# Patient Record
Sex: FEMALE | Race: WHITE | Employment: OTHER | URBAN - METROPOLITAN AREA
[De-identification: names, ages, dates, MRNs, and addresses within clinical notes are randomized per-mention and may not be internally consistent; named-entity substitution may affect disease eponyms.]

---

## 2017-07-27 ENCOUNTER — OFFICE VISIT (OUTPATIENT)
Dept: FAMILY MEDICINE CLINIC | Facility: CLINIC | Age: 67
End: 2017-07-27

## 2017-07-27 ENCOUNTER — HOSPITAL ENCOUNTER (OUTPATIENT)
Dept: GENERAL RADIOLOGY | Age: 67
Discharge: HOME | End: 2017-07-27
Attending: FAMILY MEDICINE | Admitting: FAMILY MEDICINE
Payer: MEDICARE

## 2017-07-27 VITALS — DIASTOLIC BLOOD PRESSURE: 72 MMHG | SYSTOLIC BLOOD PRESSURE: 128 MMHG | HEIGHT: 61 IN | TEMPERATURE: 97 F

## 2017-07-27 DIAGNOSIS — M25.472 LEFT ANKLE SWELLING: Primary | ICD-10-CM

## 2017-07-27 DIAGNOSIS — I83.812 VARICOSE VEINS OF LEFT LOWER EXTREMITIES WITH PAIN: ICD-10-CM

## 2017-07-27 DIAGNOSIS — M25.572 ACUTE LEFT ANKLE PAIN: ICD-10-CM

## 2017-07-27 DIAGNOSIS — M25.472 LEFT ANKLE SWELLING: ICD-10-CM

## 2017-07-27 PROCEDURE — G0463 HOSPITAL OUTPT CLINIC VISIT: HCPCS | Performed by: FAMILY MEDICINE

## 2017-07-27 PROCEDURE — 73610 X-RAY EXAM OF ANKLE: CPT | Performed by: FAMILY MEDICINE

## 2017-07-27 PROCEDURE — 99214 OFFICE O/P EST MOD 30 MIN: CPT | Performed by: FAMILY MEDICINE

## 2017-07-27 RX ORDER — MELOXICAM 7.5 MG/1
7.5 TABLET ORAL DAILY
Qty: 30 TABLET | Refills: 0 | Status: SHIPPED | OUTPATIENT
Start: 2017-07-27 | End: 2017-10-24

## 2017-07-27 NOTE — PROGRESS NOTES
Patient ID: Ke Hernandez is a 74year old female. HPI  Patient presents with: Foot Injury: L. x4 weeks     She states she was moving and she had to go up a flight of stairs up and down numerous times.   She was in an old house with many flights of sta have some varicose veins, especially in the medial aspect of the ankle but otherwise really no edema but does have some puffiness on the medial aspect of the ankle where the varicose veins are. There is no ulcerations of the skin or break in the skin.   Ne

## 2017-08-01 ENCOUNTER — TELEPHONE (OUTPATIENT)
Dept: FAMILY MEDICINE CLINIC | Facility: CLINIC | Age: 67
End: 2017-08-01

## 2017-08-01 NOTE — TELEPHONE ENCOUNTER
Pt would like to get results of XR of L ankle. Pt. States that pain medication is not helping with ankle pain.

## 2017-08-07 NOTE — TELEPHONE ENCOUNTER
Notes Recorded by Kenya Jung on 8/1/2017 at 1:31 PM CDT  Lab letter mailed to patient  ------    Notes Recorded by Brandee Cox DO on 7/27/2017 at 5:06 PM CDT  Left ankle shows no fracture.  You do have some soft tissue swelling and edema but this

## 2017-10-24 ENCOUNTER — LAB ENCOUNTER (OUTPATIENT)
Dept: LAB | Age: 67
End: 2017-10-24
Attending: FAMILY MEDICINE
Payer: MEDICARE

## 2017-10-24 ENCOUNTER — OFFICE VISIT (OUTPATIENT)
Dept: FAMILY MEDICINE CLINIC | Facility: CLINIC | Age: 67
End: 2017-10-24

## 2017-10-24 ENCOUNTER — HOSPITAL ENCOUNTER (OUTPATIENT)
Dept: GENERAL RADIOLOGY | Age: 67
Discharge: HOME | End: 2017-10-24
Attending: FAMILY MEDICINE
Payer: MEDICARE

## 2017-10-24 ENCOUNTER — HOSPITAL ENCOUNTER (OUTPATIENT)
Dept: GENERAL RADIOLOGY | Age: 67
Discharge: HOME | End: 2017-10-24
Attending: FAMILY MEDICINE | Admitting: FAMILY MEDICINE
Payer: MEDICARE

## 2017-10-24 VITALS
HEART RATE: 94 BPM | HEIGHT: 61 IN | TEMPERATURE: 98 F | BODY MASS INDEX: 21.14 KG/M2 | WEIGHT: 112 LBS | DIASTOLIC BLOOD PRESSURE: 75 MMHG | SYSTOLIC BLOOD PRESSURE: 118 MMHG

## 2017-10-24 DIAGNOSIS — R60.0 BILATERAL LEG EDEMA: Primary | ICD-10-CM

## 2017-10-24 DIAGNOSIS — I83.813 VARICOSE VEINS OF BILATERAL LOWER EXTREMITIES WITH PAIN: ICD-10-CM

## 2017-10-24 DIAGNOSIS — M79.89 LEG SWELLING: ICD-10-CM

## 2017-10-24 DIAGNOSIS — L84 CALLUS OF FOOT: ICD-10-CM

## 2017-10-24 DIAGNOSIS — M21.41 PES PLANUS OF BOTH FEET: ICD-10-CM

## 2017-10-24 DIAGNOSIS — R60.0 BILATERAL LEG EDEMA: ICD-10-CM

## 2017-10-24 DIAGNOSIS — S90.32XA BRUISED SOLE OF FOOT, LEFT, INITIAL ENCOUNTER: ICD-10-CM

## 2017-10-24 DIAGNOSIS — S90.32XA CONTUSION OF LEFT FOOT, INITIAL ENCOUNTER: ICD-10-CM

## 2017-10-24 DIAGNOSIS — M79.672 LEFT FOOT PAIN: ICD-10-CM

## 2017-10-24 DIAGNOSIS — Z00.00 ROUTINE GENERAL MEDICAL EXAMINATION AT A HEALTH CARE FACILITY: Primary | ICD-10-CM

## 2017-10-24 DIAGNOSIS — M21.42 PES PLANUS OF BOTH FEET: ICD-10-CM

## 2017-10-24 PROCEDURE — 93010 ELECTROCARDIOGRAM REPORT: CPT | Performed by: FAMILY MEDICINE

## 2017-10-24 PROCEDURE — 80061 LIPID PANEL: CPT | Performed by: FAMILY MEDICINE

## 2017-10-24 PROCEDURE — 71020 XR CHEST PA + LAT CHEST (CPT=71020): CPT | Performed by: FAMILY MEDICINE

## 2017-10-24 PROCEDURE — G0463 HOSPITAL OUTPT CLINIC VISIT: HCPCS | Performed by: FAMILY MEDICINE

## 2017-10-24 PROCEDURE — 80053 COMPREHEN METABOLIC PANEL: CPT

## 2017-10-24 PROCEDURE — 85025 COMPLETE CBC W/AUTO DIFF WBC: CPT

## 2017-10-24 PROCEDURE — 99215 OFFICE O/P EST HI 40 MIN: CPT | Performed by: FAMILY MEDICINE

## 2017-10-24 PROCEDURE — 84443 ASSAY THYROID STIM HORMONE: CPT

## 2017-10-24 PROCEDURE — 84481 FREE ASSAY (FT-3): CPT | Performed by: FAMILY MEDICINE

## 2017-10-24 PROCEDURE — 93005 ELECTROCARDIOGRAM TRACING: CPT

## 2017-10-24 PROCEDURE — 36415 COLL VENOUS BLD VENIPUNCTURE: CPT

## 2017-10-24 PROCEDURE — 73630 X-RAY EXAM OF FOOT: CPT | Performed by: FAMILY MEDICINE

## 2017-10-24 PROCEDURE — 84439 ASSAY OF FREE THYROXINE: CPT | Performed by: FAMILY MEDICINE

## 2017-10-24 RX ORDER — SPIRONOLACTONE 25 MG/1
25 TABLET ORAL 2 TIMES DAILY
Qty: 60 TABLET | Refills: 0 | Status: SHIPPED | OUTPATIENT
Start: 2017-10-24 | End: 2017-11-07

## 2017-10-24 NOTE — PROGRESS NOTES
Patient ID: Amarilys Mayers is a 69year old female. HPI  Patient presents with:  Edema: L. leg. ankle  bilateral   I had seen her in July 2017 due to left leg edema due to varicose veins. We had an x-ray done which showed no fractures.     She states th history. No current outpatient prescriptions on file.   Allergies:  Penicillins             Rash   PHYSICAL EXAM:   Physical Exam  Blood pressure 118/75, pulse 94, temperature 97.7 °F (36.5 °C), temperature source Oral, height 5' 1\" (1.549 m), weight that would indicate a DVT as she has been active and really does not have any calf tenderness, redness, shortness of breath or chest pain. Varicose veins of bilateral lower extremities with pain  -     US VENOUS INSUFFICIENCY BILATERAL  (ZZB=[id number]);  Futur

## 2017-10-25 ENCOUNTER — TELEPHONE (OUTPATIENT)
Dept: OTHER | Age: 67
End: 2017-10-25

## 2017-10-25 NOTE — TELEPHONE ENCOUNTER
LMTCB. Transfer to [id number].    ----- Message from Randall Pereira RN sent at 10/25/2017  2:19 PM CDT -----      ----- Message -----  From: Bell Emanuel DO  Sent: 10/25/2017   2:06 PM  To:  Princess Mo Lpn/Ishan    EKGs shows sinus rhythm with some extra beats

## 2017-10-25 NOTE — TELEPHONE ENCOUNTER
Please advise ,Pt had appt yesterday,started RX spironolactone 25 MG Oral Tab-have taken 3  3 doses-c/c hardly urinating,do not feel bloated or have the urge to urinate   Stated she was urinatinating less than normal but can wiggle her toes more than yeste

## 2017-10-25 NOTE — TELEPHONE ENCOUNTER
LMTCB with patient's son. Transfer to [phone].      ----- Message from Mindy Lewis DO sent at 10/25/2017  2:57 PM CDT -----  Let her know her TSH is much too high. This means she has an underactive thyroid. I need to place her on thyroid medication.   Eddie Hermosillo

## 2017-10-26 NOTE — TELEPHONE ENCOUNTER
LMTCB with her . Please review all messages. I do not know is she is aware of the TSH results either. The one below is knew.         Notes Recorded by Luisa Briones DO on 10/25/2017 at 8:34 PM CDT  Your bad cholesterol is 115 and because of

## 2017-10-26 NOTE — TELEPHONE ENCOUNTER
I would continue with the spironolactone twice daily.   If she truly has urinary retention where she cannot urinate at all and her lower abdomen is getting very painful or bloated then of course she needs to let us know or go immediately to the emergency ro

## 2017-10-27 ENCOUNTER — OFFICE VISIT (OUTPATIENT)
Dept: PODIATRY CLINIC | Facility: CLINIC | Age: 67
End: 2017-10-27

## 2017-10-27 DIAGNOSIS — M79.672 PAIN OF LEFT HEEL: Primary | ICD-10-CM

## 2017-10-27 DIAGNOSIS — Q82.8 POROKERATOSIS: ICD-10-CM

## 2017-10-27 PROCEDURE — 99202 OFFICE O/P NEW SF 15 MIN: CPT | Performed by: PODIATRIST

## 2017-10-27 PROCEDURE — G0463 HOSPITAL OUTPT CLINIC VISIT: HCPCS | Performed by: PODIATRIST

## 2017-10-27 NOTE — PROGRESS NOTES
HPI:    Patient ID: Debbie Ramirez is a 69year old female. HPI  This pleasant 20-year-old female presents as a new patient to me on referral from  Ashley Garrison.   Patient is chief complaint is left heel pain in an area where she is aware of the callus that h encounter    Imaging & Referrals:  None       KRISTINE#[id number]

## 2017-10-31 ENCOUNTER — OFFICE VISIT (OUTPATIENT)
Dept: FAMILY MEDICINE CLINIC | Facility: CLINIC | Age: 67
End: 2017-10-31

## 2017-10-31 VITALS
HEIGHT: 61 IN | TEMPERATURE: 98 F | HEART RATE: 84 BPM | RESPIRATION RATE: 12 BRPM | DIASTOLIC BLOOD PRESSURE: 73 MMHG | SYSTOLIC BLOOD PRESSURE: 122 MMHG

## 2017-10-31 DIAGNOSIS — R60.0 BILATERAL LEG EDEMA: Primary | ICD-10-CM

## 2017-10-31 DIAGNOSIS — E78.2 MIXED HYPERLIPIDEMIA: ICD-10-CM

## 2017-10-31 DIAGNOSIS — I70.0 CALCIFICATION OF AORTA (HCC): ICD-10-CM

## 2017-10-31 DIAGNOSIS — E03.9 ACQUIRED HYPOTHYROIDISM: ICD-10-CM

## 2017-10-31 PROCEDURE — G0463 HOSPITAL OUTPT CLINIC VISIT: HCPCS | Performed by: FAMILY MEDICINE

## 2017-10-31 PROCEDURE — 99214 OFFICE O/P EST MOD 30 MIN: CPT | Performed by: FAMILY MEDICINE

## 2017-10-31 NOTE — PROGRESS NOTES
Patient ID: Domenic Astorga is a 69year old female. HPI  Patient presents with:   Follow - Up: lipids    She states after I placed her on the spironolactone and the thyroid medication she feels like she is walking a bit quicker and she has less pain in h 10/24/2017   CREATSERUM 0.43 (L) 10/24/2017   ANIONGAP 10 10/24/2017   GFRNAA >60 10/24/2017   GFRAA >60 10/24/2017   CA 9.3 10/24/2017   OSMOCALC 271 (L) 10/24/2017   ALKPHO 82 10/24/2017   AST 24 10/24/2017   ALT 15 10/24/2017   BILT 0.5 10/24/2017   TP 126 lb (57.2 kg)      There is no height or weight on file to calculate BMI.     BP Readings from Last 6 Encounters:  10/31/17 : 122/73  10/24/17 : 118/75  07/27/17 : 128/72  02/02/16 : 103/62  07/13/15 : 112/73  03/16/15 : 132/77        Review of Systems Diagnoses and all orders for this visit:    Bilateral leg edema  -     BASIC METABOLIC PANEL (8); Future  In 1 week I want you to do a BMP. She looks in no distress.   Acquired hypothyroidism  Go ahead and stay on the Levoxyl  Mixed hyperlipidemia  Go

## 2017-11-02 ENCOUNTER — NURSE TRIAGE (OUTPATIENT)
Dept: OTHER | Age: 67
End: 2017-11-02

## 2017-11-02 NOTE — TELEPHONE ENCOUNTER
Dr Sweta Maradiaga, pt called back about message below and did schedule appt for tomorrow so you can assess her. If you are able to offer any advice before then, please let us know; otherwise pt will see you at 9:30am tomorrow.

## 2017-11-03 ENCOUNTER — HOSPITAL ENCOUNTER (OUTPATIENT)
Dept: GENERAL RADIOLOGY | Age: 67
Discharge: HOME | End: 2017-11-03
Attending: FAMILY MEDICINE | Admitting: FAMILY MEDICINE
Payer: MEDICARE

## 2017-11-03 ENCOUNTER — OFFICE VISIT (OUTPATIENT)
Dept: FAMILY MEDICINE CLINIC | Facility: CLINIC | Age: 67
End: 2017-11-03

## 2017-11-03 VITALS
HEIGHT: 61 IN | HEART RATE: 84 BPM | TEMPERATURE: 97 F | BODY MASS INDEX: 20.2 KG/M2 | SYSTOLIC BLOOD PRESSURE: 118 MMHG | DIASTOLIC BLOOD PRESSURE: 70 MMHG | WEIGHT: 107 LBS

## 2017-11-03 DIAGNOSIS — R60.0 BILATERAL LEG EDEMA: Primary | ICD-10-CM

## 2017-11-03 DIAGNOSIS — M79.672 PAIN IN LEFT FOOT: ICD-10-CM

## 2017-11-03 DIAGNOSIS — K14.8 DRY TONGUE: ICD-10-CM

## 2017-11-03 DIAGNOSIS — S90.32XA CONTUSION OF LEFT FOOT, INITIAL ENCOUNTER: ICD-10-CM

## 2017-11-03 DIAGNOSIS — K14.0 GLOSSITIS: ICD-10-CM

## 2017-11-03 PROCEDURE — 73620 X-RAY EXAM OF FOOT: CPT | Performed by: FAMILY MEDICINE

## 2017-11-03 PROCEDURE — G0463 HOSPITAL OUTPT CLINIC VISIT: HCPCS | Performed by: FAMILY MEDICINE

## 2017-11-03 PROCEDURE — 99214 OFFICE O/P EST MOD 30 MIN: CPT | Performed by: FAMILY MEDICINE
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
## 2017-11-06 ENCOUNTER — APPOINTMENT (OUTPATIENT)
Dept: LAB | Age: 67
End: 2017-11-06
Attending: FAMILY MEDICINE
Payer: MEDICARE

## 2017-11-06 DIAGNOSIS — R60.0 BILATERAL LEG EDEMA: ICD-10-CM

## 2017-11-06 PROCEDURE — 36415 COLL VENOUS BLD VENIPUNCTURE: CPT

## 2017-11-06 PROCEDURE — 80048 BASIC METABOLIC PNL TOTAL CA: CPT

## 2017-11-07 ENCOUNTER — OFFICE VISIT (OUTPATIENT)
Dept: FAMILY MEDICINE CLINIC | Facility: CLINIC | Age: 67
End: 2017-11-07

## 2017-11-07 VITALS
BODY MASS INDEX: 20.58 KG/M2 | HEART RATE: 88 BPM | WEIGHT: 109 LBS | RESPIRATION RATE: 14 BRPM | HEIGHT: 61 IN | SYSTOLIC BLOOD PRESSURE: 126 MMHG | DIASTOLIC BLOOD PRESSURE: 74 MMHG | TEMPERATURE: 99 F

## 2017-11-07 DIAGNOSIS — E87.1 HYPONATREMIA: Primary | ICD-10-CM

## 2017-11-07 PROCEDURE — 99213 OFFICE O/P EST LOW 20 MIN: CPT | Performed by: FAMILY MEDICINE

## 2017-11-07 PROCEDURE — G0463 HOSPITAL OUTPT CLINIC VISIT: HCPCS | Performed by: FAMILY MEDICINE

## 2017-11-07 NOTE — PROGRESS NOTES
Milly Lindsay is a 69year old female. Patient presents with: Follow - Up: low sodium     HPI:   Here for follow up on low sodium. Drinks about 4 cups of coffee in the mornings and drinks a lot of water thereafter. Reports eats fairly well.  Usually has distress  NECK: supple,no adenopathy,no bruits  LUNGS: clear to auscultation  CARDIO: RRR without murmur  EXTREMITIES: no cyanosis, clubbing or edema  wide lower legs but no pitting edema     ASSESSMENT AND PLAN:   1.  Hyponatremia  Repeat BMP this Saturday

## 2017-11-10 NOTE — PROGRESS NOTES
Other labs were normal so believe low sodium from the water pill and excess coffee drinking.  Discussed with pt cutting back on fluid intake

## 2017-11-14 ENCOUNTER — TELEPHONE (OUTPATIENT)
Dept: FAMILY MEDICINE CLINIC | Facility: CLINIC | Age: 67
End: 2017-11-14

## 2017-11-14 NOTE — TELEPHONE ENCOUNTER
Pt called in upset that she had to reschedule her appt from 11/21. I offered the next available but Pt insisted that Dr. Sonal Guthrie needed to see her sooner.     Please reply to pool: ADRI Kemp

## 2017-11-27 ENCOUNTER — OFFICE VISIT (OUTPATIENT)
Dept: FAMILY MEDICINE CLINIC | Facility: CLINIC | Age: 67
End: 2017-11-27

## 2017-11-27 ENCOUNTER — APPOINTMENT (OUTPATIENT)
Dept: LAB | Age: 67
End: 2017-11-27
Attending: FAMILY MEDICINE
Payer: MEDICARE

## 2017-11-27 VITALS
BODY MASS INDEX: 21.14 KG/M2 | WEIGHT: 112 LBS | TEMPERATURE: 97 F | HEIGHT: 61 IN | HEART RATE: 90 BPM | SYSTOLIC BLOOD PRESSURE: 116 MMHG | DIASTOLIC BLOOD PRESSURE: 74 MMHG

## 2017-11-27 DIAGNOSIS — M25.511 PAIN IN JOINT OF RIGHT SHOULDER: ICD-10-CM

## 2017-11-27 DIAGNOSIS — Z96.611 STATUS POST REVERSE ARTHROPLASTY OF RIGHT SHOULDER: ICD-10-CM

## 2017-11-27 DIAGNOSIS — E03.9 ACQUIRED HYPOTHYROIDISM: Primary | ICD-10-CM

## 2017-11-27 DIAGNOSIS — E87.1 HYPONATREMIA: ICD-10-CM

## 2017-11-27 DIAGNOSIS — E78.2 MIXED HYPERLIPIDEMIA: ICD-10-CM

## 2017-11-27 DIAGNOSIS — E03.9 ACQUIRED HYPOTHYROIDISM: ICD-10-CM

## 2017-11-27 PROCEDURE — 80048 BASIC METABOLIC PNL TOTAL CA: CPT

## 2017-11-27 PROCEDURE — 36415 COLL VENOUS BLD VENIPUNCTURE: CPT

## 2017-11-27 PROCEDURE — 99214 OFFICE O/P EST MOD 30 MIN: CPT | Performed by: FAMILY MEDICINE

## 2017-11-27 PROCEDURE — 84443 ASSAY THYROID STIM HORMONE: CPT

## 2017-11-27 PROCEDURE — G0463 HOSPITAL OUTPT CLINIC VISIT: HCPCS | Performed by: FAMILY MEDICINE

## 2017-11-27 RX ORDER — LEVOTHYROXINE SODIUM 0.1 MG/1
100 TABLET ORAL
Qty: 30 TABLET | Refills: 2 | Status: ON HOLD | OUTPATIENT
Start: 2017-11-27 | End: 2021-01-01

## 2017-11-27 NOTE — PROGRESS NOTES
Patient ID: Reg Ceballos is a 69year old female. HPI  Patient presents with:  Lab  On November 6, 2017 her sodium was 127. We felt this was most likely due to drinking too much fluid. We had her stop the Aldactone 25 mg.   She did follow with Dr. Julius Padilla 11/06/2017   ANIONGAP 6 11/06/2017   GFRAA >60 11/06/2017   GFRNAA >60 11/06/2017   CA 9.3 11/06/2017    (L) 11/06/2017   K 4.7 11/06/2017   CL 94 (L) 11/06/2017   CO2 27 11/06/2017   OSMOCALC 262 (L) 11/06/2017         Lab Results  Component Value D Negative for choking, shortness of breath and wheezing. Cardiovascular: Negative for chest pain, palpitations and leg swelling. Neurological: Negative for dizziness, speech difficulty, light-headedness and headaches.          Past Medical History:   Adriana Kendrick motion of the right shoulder after the arthroplasty. Nursing note and vitals reviewed.          ASSESSMENT/PLAN:     Diagnoses and all orders for this visit:    Acquired hypothyroidism  -     ASSAY, THYROID STIM HORMONE; Future  She will go down and do a T

## 2018-01-01 NOTE — TELEPHONE ENCOUNTER
Patient returning call, informed pt of the multiple notes from Dr Skye Gilliam, patient verbalized understanding. Advised patient that her new medications were sent to pharmacy.  Pt did not have appointment for next week, booked patient with Dr Skye Gilliam for Tuesday 0

## 2021-01-01 ENCOUNTER — APPOINTMENT (OUTPATIENT)
Dept: GENERAL RADIOLOGY | Facility: HOSPITAL | Age: 71
End: 2021-01-01
Attending: EMERGENCY MEDICINE
Payer: MEDICARE

## 2021-01-01 ENCOUNTER — HOSPITAL ENCOUNTER (EMERGENCY)
Facility: HOSPITAL | Age: 71
Discharge: LEFT BEFORE DISCHARGE | End: 2021-01-01
Payer: MEDICARE

## 2021-01-01 ENCOUNTER — APPOINTMENT (OUTPATIENT)
Dept: CT IMAGING | Facility: HOSPITAL | Age: 71
End: 2021-01-01
Attending: EMERGENCY MEDICINE
Payer: MEDICARE

## 2021-01-01 ENCOUNTER — HOSPITAL ENCOUNTER (EMERGENCY)
Facility: HOSPITAL | Age: 71
Discharge: HOME | End: 2021-01-01
Attending: EMERGENCY MEDICINE
Payer: MEDICARE

## 2021-01-01 ENCOUNTER — HOSPITAL ENCOUNTER (INPATIENT)
Facility: HOSPITAL | Age: 71
LOS: 2 days | Discharge: HOSPICE | DRG: 545 | End: 2021-01-01
Attending: HOSPITALIST | Admitting: HOSPITALIST
Payer: OTHER MISCELLANEOUS

## 2021-01-01 ENCOUNTER — HOSPITAL ENCOUNTER (OUTPATIENT)
Facility: HOSPITAL | Age: 71
Setting detail: OBSERVATION
Discharge: HOME | End: 2021-01-01
Attending: EMERGENCY MEDICINE | Admitting: HOSPITALIST
Payer: MEDICARE

## 2021-01-01 ENCOUNTER — HOSPITAL ENCOUNTER (INPATIENT)
Facility: HOSPITAL | Age: 71
LOS: 2 days | Discharge: HOME | DRG: 871 | End: 2021-01-01
Attending: EMERGENCY MEDICINE | Admitting: HOSPITALIST
Payer: MEDICARE

## 2021-01-01 ENCOUNTER — APPOINTMENT (OUTPATIENT)
Dept: GENERAL RADIOLOGY | Facility: HOSPITAL | Age: 71
DRG: 871 | End: 2021-01-01
Attending: EMERGENCY MEDICINE
Payer: MEDICARE

## 2021-01-01 VITALS
RESPIRATION RATE: 16 BRPM | BODY MASS INDEX: 23 KG/M2 | TEMPERATURE: 98 F | SYSTOLIC BLOOD PRESSURE: 103 MMHG | HEART RATE: 90 BPM | DIASTOLIC BLOOD PRESSURE: 63 MMHG | WEIGHT: 121.25 LBS | OXYGEN SATURATION: 92 %

## 2021-01-01 VITALS
WEIGHT: 121.25 LBS | DIASTOLIC BLOOD PRESSURE: 64 MMHG | HEART RATE: 89 BPM | SYSTOLIC BLOOD PRESSURE: 119 MMHG | BODY MASS INDEX: 23 KG/M2 | OXYGEN SATURATION: 96 % | TEMPERATURE: 97 F | RESPIRATION RATE: 19 BRPM

## 2021-01-01 VITALS
OXYGEN SATURATION: 81 % | SYSTOLIC BLOOD PRESSURE: 94 MMHG | DIASTOLIC BLOOD PRESSURE: 63 MMHG | TEMPERATURE: 99 F | HEART RATE: 60 BPM | RESPIRATION RATE: 18 BRPM

## 2021-01-01 VITALS
HEART RATE: 115 BPM | DIASTOLIC BLOOD PRESSURE: 82 MMHG | RESPIRATION RATE: 20 BRPM | SYSTOLIC BLOOD PRESSURE: 107 MMHG | TEMPERATURE: 99 F | OXYGEN SATURATION: 95 %

## 2021-01-01 VITALS
OXYGEN SATURATION: 97 % | TEMPERATURE: 99 F | WEIGHT: 120 LBS | BODY MASS INDEX: 22.66 KG/M2 | HEIGHT: 61 IN | SYSTOLIC BLOOD PRESSURE: 144 MMHG | RESPIRATION RATE: 18 BRPM | DIASTOLIC BLOOD PRESSURE: 70 MMHG | HEART RATE: 84 BPM

## 2021-01-01 VITALS
WEIGHT: 120 LBS | HEART RATE: 83 BPM | DIASTOLIC BLOOD PRESSURE: 68 MMHG | BODY MASS INDEX: 22.66 KG/M2 | RESPIRATION RATE: 17 BRPM | TEMPERATURE: 98 F | HEIGHT: 61 IN | OXYGEN SATURATION: 100 % | SYSTOLIC BLOOD PRESSURE: 131 MMHG

## 2021-01-01 DIAGNOSIS — L89.154 PRESSURE INJURY OF SACRAL REGION, STAGE 4 (HCC): ICD-10-CM

## 2021-01-01 DIAGNOSIS — M25.562 LEFT KNEE PAIN, UNSPECIFIED CHRONICITY: ICD-10-CM

## 2021-01-01 DIAGNOSIS — N39.0 SEPSIS DUE TO URINARY TRACT INFECTION (HCC): Primary | ICD-10-CM

## 2021-01-01 DIAGNOSIS — S00.03XA HEMATOMA OF SCALP, INITIAL ENCOUNTER: Primary | ICD-10-CM

## 2021-01-01 DIAGNOSIS — G89.29 CHRONIC PAIN OF LEFT KNEE: ICD-10-CM

## 2021-01-01 DIAGNOSIS — R53.1 WEAKNESS GENERALIZED: Primary | ICD-10-CM

## 2021-01-01 DIAGNOSIS — M25.562 CHRONIC PAIN OF LEFT KNEE: ICD-10-CM
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
## 2021-01-01 DIAGNOSIS — A41.9 SEPSIS DUE TO URINARY TRACT INFECTION (HCC): Primary | ICD-10-CM

## 2021-01-01 DIAGNOSIS — R62.7 FAILURE TO THRIVE IN ADULT: Primary | ICD-10-CM

## 2021-01-01 PROCEDURE — 85025 COMPLETE CBC W/AUTO DIFF WBC: CPT | Performed by: EMERGENCY MEDICINE

## 2021-01-01 PROCEDURE — 74177 CT ABD & PELVIS W/CONTRAST: CPT | Performed by: EMERGENCY MEDICINE

## 2021-01-01 PROCEDURE — 99284 EMERGENCY DEPT VISIT MOD MDM: CPT

## 2021-01-01 PROCEDURE — 99221 1ST HOSP IP/OBS SF/LOW 40: CPT | Performed by: SURGERY

## 2021-01-01 PROCEDURE — 81003 URINALYSIS AUTO W/O SCOPE: CPT | Performed by: EMERGENCY MEDICINE

## 2021-01-01 PROCEDURE — 99233 SBSQ HOSP IP/OBS HIGH 50: CPT | Performed by: HOSPITALIST

## 2021-01-01 PROCEDURE — 99239 HOSP IP/OBS DSCHRG MGMT >30: CPT | Performed by: HOSPITALIST

## 2021-01-01 PROCEDURE — 80048 BASIC METABOLIC PNL TOTAL CA: CPT | Performed by: EMERGENCY MEDICINE

## 2021-01-01 PROCEDURE — 99233 SBSQ HOSP IP/OBS HIGH 50: CPT | Performed by: INTERNAL MEDICINE

## 2021-01-01 PROCEDURE — 99497 ADVNCD CARE PLAN 30 MIN: CPT | Performed by: HOSPITALIST

## 2021-01-01 PROCEDURE — 99217 OBSERVATION CARE DISCHARGE: CPT | Performed by: HOSPITALIST

## 2021-01-01 PROCEDURE — 99222 1ST HOSP IP/OBS MODERATE 55: CPT | Performed by: HOSPITALIST

## 2021-01-01 PROCEDURE — 71045 X-RAY EXAM CHEST 1 VIEW: CPT | Performed by: EMERGENCY MEDICINE

## 2021-01-01 PROCEDURE — 70450 CT HEAD/BRAIN W/O DYE: CPT | Performed by: EMERGENCY MEDICINE

## 2021-01-01 PROCEDURE — 99223 1ST HOSP IP/OBS HIGH 75: CPT | Performed by: HOSPITALIST

## 2021-01-01 PROCEDURE — 72125 CT NECK SPINE W/O DYE: CPT | Performed by: EMERGENCY MEDICINE

## 2021-01-01 PROCEDURE — 36415 COLL VENOUS BLD VENIPUNCTURE: CPT

## 2021-01-01 PROCEDURE — 73560 X-RAY EXAM OF KNEE 1 OR 2: CPT | Performed by: EMERGENCY MEDICINE

## 2021-01-01 RX ORDER — MIRTAZAPINE 15 MG/1
15 TABLET, FILM COATED ORAL NIGHTLY
Qty: 30 TABLET | Refills: 0 | Status: SHIPPED | OUTPATIENT
Start: 2021-01-01 | End: 2021-01-01

## 2021-01-01 RX ORDER — HEPARIN SODIUM 5000 [USP'U]/ML
5000 INJECTION, SOLUTION INTRAVENOUS; SUBCUTANEOUS EVERY 12 HOURS SCHEDULED
Status: DISCONTINUED | OUTPATIENT
Start: 2021-01-01 | End: 2021-01-01

## 2021-01-01 RX ORDER — CEFADROXIL 500 MG/1
500 CAPSULE ORAL 2 TIMES DAILY
Qty: 14 CAPSULE | Refills: 0 | Status: SHIPPED | OUTPATIENT
Start: 2021-01-01 | End: 2021-01-01

## 2021-01-01 RX ORDER — SODIUM CHLORIDE 0.9 % (FLUSH) 0.9 %
10 SYRINGE (ML) INJECTION AS NEEDED
Status: DISCONTINUED | OUTPATIENT
Start: 2021-01-01 | End: 2021-01-01

## 2021-01-01 RX ORDER — LORAZEPAM 2 MG/ML
1 INJECTION INTRAMUSCULAR EVERY 4 HOURS PRN
Status: DISCONTINUED | OUTPATIENT
Start: 2021-01-01 | End: 2021-01-01

## 2021-01-01 RX ORDER — VANCOMYCIN HYDROCHLORIDE
1250
Status: DISCONTINUED | OUTPATIENT
Start: 2021-01-01 | End: 2021-01-01

## 2021-01-01 RX ORDER — VANCOMYCIN HYDROCHLORIDE
25 ONCE
Status: COMPLETED | OUTPATIENT
Start: 2021-01-01 | End: 2021-01-01

## 2021-01-01 RX ORDER — ACETAMINOPHEN 160 MG/5ML
1000 SOLUTION ORAL ONCE
Status: COMPLETED | OUTPATIENT
Start: 2021-01-01 | End: 2021-01-01

## 2021-01-01 RX ORDER — LEVOTHYROXINE SODIUM 0.05 MG/1
50 TABLET ORAL
Status: DISCONTINUED | OUTPATIENT
Start: 2021-01-01 | End: 2021-01-01

## 2021-01-01 RX ORDER — ONDANSETRON 2 MG/ML
4 INJECTION INTRAMUSCULAR; INTRAVENOUS EVERY 6 HOURS PRN
Status: DISCONTINUED | OUTPATIENT
Start: 2021-01-01 | End: 2021-01-01

## 2021-01-01 RX ORDER — HALOPERIDOL 5 MG/ML
2 INJECTION INTRAMUSCULAR
Status: DISCONTINUED | OUTPATIENT
Start: 2021-01-01 | End: 2021-01-01

## 2021-01-01 RX ORDER — ONDANSETRON 4 MG/1
4 TABLET, ORALLY DISINTEGRATING ORAL EVERY 6 HOURS PRN
Status: DISCONTINUED | OUTPATIENT
Start: 2021-01-01 | End: 2021-01-01

## 2021-01-01 RX ORDER — MIRTAZAPINE 15 MG/1
15 TABLET, FILM COATED ORAL NIGHTLY
Status: DISCONTINUED | OUTPATIENT
Start: 2021-01-01 | End: 2021-01-01

## 2021-01-01 RX ORDER — LEVOTHYROXINE SODIUM 0.1 MG/1
100 TABLET ORAL
Status: DISCONTINUED | OUTPATIENT
Start: 2021-01-01 | End: 2021-01-01

## 2021-01-01 RX ORDER — METOCLOPRAMIDE HYDROCHLORIDE 5 MG/ML
10 INJECTION INTRAMUSCULAR; INTRAVENOUS EVERY 8 HOURS PRN
Status: DISCONTINUED | OUTPATIENT
Start: 2021-01-01 | End: 2021-01-01

## 2021-01-01 RX ORDER — MELATONIN
100 DAILY
Status: DISCONTINUED | OUTPATIENT
Start: 2021-01-01 | End: 2021-01-01

## 2021-01-01 RX ORDER — LORAZEPAM 2 MG/ML
2 INJECTION INTRAMUSCULAR EVERY 4 HOURS PRN
Status: DISCONTINUED | OUTPATIENT
Start: 2021-01-01 | End: 2021-01-01

## 2021-01-01 RX ORDER — LEVOTHYROXINE SODIUM 0.1 MG/1
50 TABLET ORAL
Qty: 30 TABLET | Refills: 2 | Status: SHIPPED | COMMUNITY
Start: 2021-01-01 | End: 2021-01-01

## 2021-01-01 RX ORDER — BISACODYL 10 MG
10 SUPPOSITORY, RECTAL RECTAL
Status: DISCONTINUED | OUTPATIENT
Start: 2021-01-01 | End: 2021-01-01

## 2021-01-01 RX ORDER — DIPHENHYDRAMINE HCL 25 MG
25 CAPSULE ORAL NIGHTLY
Status: DISCONTINUED | OUTPATIENT
Start: 2021-01-01 | End: 2021-01-01

## 2021-01-01 RX ORDER — MULTIPLE VITAMINS W/ MINERALS TAB 9MG-400MCG
1 TAB ORAL DAILY
Status: DISCONTINUED | OUTPATIENT
Start: 2021-01-01 | End: 2021-01-01

## 2021-01-01 RX ORDER — SODIUM CHLORIDE 9 MG/ML
INJECTION, SOLUTION INTRAVENOUS CONTINUOUS
Status: DISCONTINUED | OUTPATIENT
Start: 2021-01-01 | End: 2021-01-01

## 2021-01-01 RX ORDER — FOLIC ACID 1 MG/1
1 TABLET ORAL DAILY
Status: DISCONTINUED | OUTPATIENT
Start: 2021-01-01 | End: 2021-01-01

## 2021-01-01 RX ORDER — FLUCONAZOLE 200 MG/1
100 TABLET ORAL DAILY
Qty: 3 TABLET | Refills: 0 | Status: SHIPPED | OUTPATIENT
Start: 2021-01-01 | End: 2021-01-01

## 2021-01-01 RX ORDER — POTASSIUM CHLORIDE 14.9 MG/ML
20 INJECTION INTRAVENOUS ONCE
Status: DISCONTINUED | OUTPATIENT
Start: 2021-01-01 | End: 2021-01-01

## 2021-01-01 RX ORDER — IBUPROFEN 400 MG/1
200 TABLET ORAL NIGHTLY
Status: DISCONTINUED | OUTPATIENT
Start: 2021-01-01 | End: 2021-01-01

## 2021-01-01 RX ORDER — MAGNESIUM OXIDE 400 MG (241.3 MG MAGNESIUM) TABLET
400 TABLET ONCE
Status: COMPLETED | OUTPATIENT
Start: 2021-01-01 | End: 2021-01-01

## 2021-01-01 RX ORDER — MULTIPLE VITAMINS W/ MINERALS TAB 9MG-400MCG
1 TAB ORAL DAILY
Qty: 30 TABLET | Refills: 0 | Status: SHIPPED | OUTPATIENT
Start: 2021-01-01 | End: 2021-01-01

## 2021-01-01 RX ORDER — ACETAMINOPHEN 650 MG/1
650 SUPPOSITORY RECTAL EVERY 4 HOURS PRN
Status: DISCONTINUED | OUTPATIENT
Start: 2021-01-01 | End: 2021-01-01

## 2021-01-01 RX ORDER — LORAZEPAM 2 MG/ML
0.5 INJECTION INTRAMUSCULAR EVERY 4 HOURS PRN
Status: DISCONTINUED | OUTPATIENT
Start: 2021-01-01 | End: 2021-01-01

## 2021-01-01 RX ORDER — HALOPERIDOL 5 MG/ML
1 INJECTION INTRAMUSCULAR
Status: DISCONTINUED | OUTPATIENT
Start: 2021-01-01 | End: 2021-01-01

## 2021-01-01 RX ORDER — MORPHINE SULFATE 20 MG/ML
5 SOLUTION ORAL
Status: DISCONTINUED | OUTPATIENT
Start: 2021-01-01 | End: 2021-01-01

## 2021-01-01 RX ORDER — CEFADROXIL 500 MG/1
500 CAPSULE ORAL 2 TIMES DAILY
Qty: 28 CAPSULE | Refills: 0 | Status: SHIPPED | OUTPATIENT
Start: 2021-01-01 | End: 2021-01-01

## 2021-01-01 RX ORDER — ASPIRIN 81 MG/1
81 TABLET, CHEWABLE ORAL DAILY
Status: DISCONTINUED | OUTPATIENT
Start: 2021-01-01 | End: 2021-01-01

## 2021-01-01 RX ORDER — FUROSEMIDE 40 MG/1
40 TABLET ORAL EVERY 8 HOURS PRN
Status: DISCONTINUED | OUTPATIENT
Start: 2021-01-01 | End: 2021-01-01

## 2021-01-01 RX ORDER — FOLIC ACID 1 MG/1
1 TABLET ORAL DAILY
COMMUNITY
End: 2021-01-01

## 2021-01-01 RX ORDER — GLYCOPYRROLATE 0.2 MG/ML
0.4 INJECTION, SOLUTION INTRAMUSCULAR; INTRAVENOUS
Status: DISCONTINUED | OUTPATIENT
Start: 2021-01-01 | End: 2021-01-01

## 2021-01-01 RX ORDER — FLUCONAZOLE 200 MG/1
200 TABLET ORAL DAILY
Status: DISCONTINUED | OUTPATIENT
Start: 2021-01-01 | End: 2021-01-01

## 2021-01-01 RX ORDER — DEXTROSE AND SODIUM CHLORIDE 5; .9 G/100ML; G/100ML
INJECTION, SOLUTION INTRAVENOUS CONTINUOUS
Status: DISCONTINUED | OUTPATIENT
Start: 2021-01-01 | End: 2021-01-01

## 2021-01-01 RX ORDER — MORPHINE SULFATE 2 MG/ML
1 INJECTION, SOLUTION INTRAMUSCULAR; INTRAVENOUS
Status: DISCONTINUED | OUTPATIENT
Start: 2021-01-01 | End: 2021-01-01

## 2021-01-01 RX ORDER — MAGNESIUM SULFATE HEPTAHYDRATE 40 MG/ML
2 INJECTION, SOLUTION INTRAVENOUS ONCE
Status: COMPLETED | OUTPATIENT
Start: 2021-01-01 | End: 2021-01-01

## 2021-01-01 RX ORDER — ACETAMINOPHEN 500 MG
1000 TABLET ORAL ONCE
Status: DISCONTINUED | OUTPATIENT
Start: 2021-01-01 | End: 2021-01-01

## 2021-01-01 RX ORDER — SODIUM CHLORIDE 9 MG/ML
1000 INJECTION, SOLUTION INTRAVENOUS ONCE
Status: COMPLETED | OUTPATIENT
Start: 2021-01-01 | End: 2021-01-01

## 2021-01-01 RX ORDER — FUROSEMIDE 10 MG/ML
40 INJECTION INTRAMUSCULAR; INTRAVENOUS EVERY 8 HOURS PRN
Status: DISCONTINUED | OUTPATIENT
Start: 2021-01-01 | End: 2021-01-01

## 2021-01-01 RX ORDER — LEVOTHYROXINE SODIUM 0.03 MG/1
25 TABLET ORAL
Status: DISCONTINUED | OUTPATIENT
Start: 2021-01-01 | End: 2021-01-01

## 2021-01-01 RX ORDER — ACETAMINOPHEN 325 MG/1
650 TABLET ORAL EVERY 6 HOURS PRN
Status: DISCONTINUED | OUTPATIENT
Start: 2021-01-01 | End: 2021-01-01

## 2021-01-01 RX ORDER — DEXTROSE AND SODIUM CHLORIDE 5; .45 G/100ML; G/100ML
INJECTION, SOLUTION INTRAVENOUS CONTINUOUS
Status: DISCONTINUED | OUTPATIENT
Start: 2021-01-01 | End: 2021-01-01

## 2021-01-01 RX ORDER — MELATONIN
100 DAILY
Qty: 30 TABLET | Refills: 0 | Status: SHIPPED | OUTPATIENT
Start: 2021-01-01 | End: 2021-01-01

## 2021-01-01 RX ORDER — CIPROFLOXACIN 500 MG/1
500 TABLET, FILM COATED ORAL 2 TIMES DAILY
Qty: 14 TABLET | Refills: 0 | Status: SHIPPED | OUTPATIENT
Start: 2021-01-01 | End: 2021-01-01

## 2021-01-01 RX ORDER — HYOSCYAMINE SULFATE 0.125 MG
0.12 TABLET,DISINTEGRATING ORAL 4 TIMES DAILY PRN
Status: DISCONTINUED | OUTPATIENT
Start: 2021-01-01 | End: 2021-01-01

## 2021-06-02 NOTE — ED PROVIDER NOTES
Patient Seen in: [facility] AND [facility] Emergency Department      History   Patient presents with:  Fall    Stated Complaint: fall    HPI/Subjective:   HPI    47-year-old female presents for evaluation after a fall.   Patient reports trip and fall earlier this General: Bowel sounds are normal. There is no distension. Palpations: Abdomen is soft. Tenderness: There is no abdominal tenderness. There is no guarding or rebound. Musculoskeletal:         General: Normal range of motion.       Cervical b changes, and osteoarthritis within the cervical spine, which has progressed since prior imaging from 2013. 2.  Thyroid nodules measuring up to 15 mm in diameter. Correlate with thyroid ultrasound if not previously assessed.    Dictated by (CST): Lopez Vasquez

## 2021-06-02 NOTE — ED QUICK NOTES
Able to ambulate using her own cane with steady gait. Pt and family verbalized understanding of d/c instructions and appropriate follow-up information. Given copy of d/c papers.

## 2021-06-02 NOTE — ED INITIAL ASSESSMENT (HPI)
EMS states that the Pt fell backwards at home hitting her head. No LOC. Has occipital area hematoma.  No N/V

## 2021-06-10 NOTE — ED INITIAL ASSESSMENT (HPI)
Pt to ED c/o fatigue and weakness x 4 days. Pt reports she fell 4 days ago, was seen here and since has not been able to walk.

## 2021-06-10 NOTE — ED PROVIDER NOTES
Patient Seen in: [facility] AND [facility] Emergency Department      History   Patient presents with:  Fatigue    Stated Complaint:     HPI/Subjective:   HPI    78-year-old female with history of thyroid disorder and hyperlipidemia presents with complaints of le pallor or injection  ENT, Mouth: mucous membranes moist  Respiratory: there are no retractions, lungs are clear to auscultation  Cardiovascular: regular rate and rhythm  Gastrointestinal:  abdomen is soft and non tender, no masses, bowel sounds normal  Yue regarding patient's functionality at home. She declines any inpatient rehabilitation or nursing home care. She has been arranged for home health. The patient and son are comfortable with the plan.                              Disposition and Plan     Cli

## 2021-06-10 NOTE — CM/SW NOTE
Spoke to the pt in regards to rehab, she declines. Offered to have Lacy Arnett with RN, PT, OT and she stated Angela Sandhu is a miracle. \"  Will set up referral through [facility]. Instructed that the Lacy Arnett agency will call her to set up appointments.

## 2021-06-10 NOTE — ED QUICK NOTES
Pt walked with walker with this  [employer] standby assist. Pt was able to walk, but said she didn't feel confident walking alone. Pt son is now at bedside.

## 2021-10-08 PROBLEM — A41.9 SEPSIS DUE TO URINARY TRACT INFECTION: Status: ACTIVE | Noted: 2021-01-01

## 2021-10-08 PROBLEM — N39.0 SEPSIS DUE TO URINARY TRACT INFECTION (HCC): Status: ACTIVE | Noted: 2021-01-01

## 2021-10-08 PROBLEM — N39.0 SEPSIS DUE TO URINARY TRACT INFECTION: Status: ACTIVE | Noted: 2021-01-01

## 2021-10-08 PROBLEM — A41.9 SEPSIS DUE TO URINARY TRACT INFECTION (HCC): Status: ACTIVE | Noted: 2021-01-01

## 2021-10-08 NOTE — ED INITIAL ASSESSMENT (HPI)
911 called by caregiver due to increased weakness. Patient noted to have severe BLE edema which medics state is chronic.  Denies fever  Per medics, patient was prescribed amoxicillin on 10/1 for an unknown reason and has not been taking it

## 2021-10-08 NOTE — ED QUICK NOTES
Pt's skin, across buttocks and sacrum, is red and excoriated with multiple open ulcers. Area cleaned and mepilex applied. Pt was turned onto her side, will continue to turn pt as needed for comfort and skin protection.

## 2021-10-08 NOTE — ED PROVIDER NOTES
Patient Seen in: [facility] Emergency Department      History   Patient presents with:  Weakness    Stated Complaint: weak    Subjective:   HPI    Pt is 64 yo F who arrives by EMS from home.  Pt states she was told to come in because her pulse was BLE. +right arm contracted  Neuro: CN intact, normal speech  SKIN: warm, dry, +excoriated skin to gluteal area        ED Course     Labs Reviewed   URINALYSIS WITH CULTURE REFLEX - Abnormal; Notable for the following components:       Result Value    Amalia 106  Rhythm: Sinus Rhythm  Reading: no TITA         MDM      Pulse Ox: 100%, Normal, RA    Cardiac Monitor: Pulse Readings from Last 1 Encounters:  10/08/21 : 93  , sinus     Radiology findings: XR CHEST AP PORTABLE  (CPT=71045)    Result Date: 10/8/2021  C urinary tract infection ([facility] [address]  (primary encounter diagnosis)     Disposition:  Admit  10/8/2021 12:51 pm    Follow-up:  No follow-up provider specified.   We recommend that you schedule follow up care with a primary care provider within the next three month

## 2021-10-08 NOTE — ED QUICK NOTES
Orders for admission, patient is aware of plan and ready to go upstairs. Any questions, please call ED RN [phone] DAV[address] Finnegan  at extension [phone]. Pt came from home via ambulance. She lives with her son and has a part time care giver.  The caregiver call [phone] for inc

## 2021-10-08 NOTE — PLAN OF CARE
Problem: Patient Centered Care  Goal: Patient preferences are identified and integrated in the patient's plan of care  Description: Interventions:  - Provide timely, complete, and accurate information to patient/family  - Incorporate patient and family k emptying  Outcome: Progressing     Problem: METABOLIC/FLUID AND ELECTROLYTES - ADULT  Goal: Electrolytes maintained within normal limits  Description: INTERVENTIONS:  - Monitor labs and rhythm and assess patient for signs and symptoms of electrolyte imbala Progressing     Problem: MUSCULOSKELETAL - ADULT  Goal: Return mobility to safest level of function  Description: INTERVENTIONS:  - Assess patient stability and activity tolerance for standing, transferring and ambulating w/ or w/o assistive devices  - Ass to promote function  Outcome: Progressing     Vital signs stable. Patient is A&Ox2. Refusing to do swallow eval at this time- does not want water or anything in mouth. IVF infusing. IV antibiotics given per orders.  Safety precautions in place, call light w

## 2021-10-08 NOTE — PLAN OF CARE
Patient lives with both sons, per son Arpit Conley. Updated son, Arpit Conley, on plan of care. He was unable to provide some medical information, allergy list, and medication list. Attempted to call other son, Carol Arambula, twice with no answer. Will keep trying.

## 2021-10-08 NOTE — H&P
[facility]    PATIENT'S NAME: Maura Anatobin   ATTENDING PHYSICIAN: Bri Vasques MD   PATIENT ACCOUNT#:   [de-identified]    LOCATION:  [place] [address]  MEDICAL RECORD #:   [id number]       YOB: 1950  ADMISSION DATE:       10/0 confusional state. She goes in and out of confusion, poor oral intake. Other 12-point review of systems unobtainable from the patient. PHYSICAL EXAMINATION:    GENERAL:  Alert.   Able to answer simple questions, but seems to be confused more than her

## 2021-10-09 NOTE — SLP NOTE
ADULT SWALLOWING EVALUATION    ASSESSMENT    ASSESSMENT/OVERALL IMPRESSION:      Proper PPE worn. Hands sanitized upon entrance/exit Pt room. This BSE was ordered d/t report of \"coughing while eating/drinking per family\".  Pt resides at home with so room.          PLAN:    To f/u x2 sessions/meals for dysphagia treatment. Will ensure safe tolerance of diet and reinforce swallowing/aspiration precautions. Will monitor for new CXR results. Family education to be completed as available.       Mariangel Jimenez bite size consistency and thin liquids without overt signs or symptoms of aspiration with 100 % accuracy over 2 session(s).   In Progress   Goal #2 The patient will utilize compensatory strategies as outlined by  BSSE (clinical evaluation) including Slow ra

## 2021-10-09 NOTE — PROGRESS NOTES
[facility]    Progress Note    Kourtney Nicholson Patient Status:  Inpatient    10/23/1950 MRN [id number]   Location [facility] Attending Al Cruz MD   Hosp Day # 1 PCP No primary care provider on file.        Subjective:   Roland Gutierrez ALKPHO 82 10/24/2017    BILT 0.5 10/24/2017    TP 8.1 10/24/2017    AST 24 10/24/2017    ALT 15 10/24/2017    T4F 1.0 10/08/2021    TSH 13.900 (H) 10/08/2021       XR CHEST AP PORTABLE  (CPT=71045)    Result Date: 10/8/2021  CONCLUSION: No acute cardiop MD  **Certification      PHYSICIAN Certification of Need for Inpatient Hospitalization - Initial Certification    Patient will require inpatient services that will reasonably be expected to span two midnight's based on the clinical documentation in H+P.

## 2021-10-09 NOTE — CM/SW NOTE
MSW met with pt and son to present hospice information and assess goals. PT was adamant about not having hospice, but does not want to make return trips to hospital. [facility] to [facility]U [facility] tomorrow.   JERRI Marrero  [facility]  [phone]

## 2021-10-09 NOTE — CM/SW NOTE
MDO for dc planning and ADv Dir indicating family unable to care for pt at home. Per chart review POLST is on file but no POA. CM called home and cell for sons listed on fs. CM lvm requesting call back on home #.    [phone]  MDO for Hospice.      LEBRON noti

## 2021-10-09 NOTE — PLAN OF CARE
Problem: Patient Centered Care  Goal: Patient preferences are identified and integrated in the patient's plan of care  Description: Interventions:  - What would you like us to know as we care for you?   Lives with two sons   - Provide timely, complete, an of antiarrhythmic and heart rate control medications as ordered  - Initiate emergency measures for life threatening arrhythmias  - Monitor electrolytes and administer replacement therapy as ordered  Outcome: Progressing     Problem: METABOLIC/FLUID AND [place] functional ability and stability  - Promote increasing activity/tolerance for mobility and gait  - Educate and engage patient/family in tolerated activity level and precautions  - Recommend use of total lift for transfers  Outcome: Progressing     Problem:

## 2021-10-09 NOTE — PLAN OF CARE
Problem: Patient Centered Care  Goal: Patient preferences are identified and integrated in the patient's plan of care  Description: Interventions:  - What would you like us to know as we care for you?  I live home with my sons  - Provide timely, complete, arrhythmias  - Monitor electrolytes and administer replacement therapy as ordered  Outcome: Progressing     Problem: GENITOURINARY - ADULT  Goal: Absence of urinary retention  Description: INTERVENTIONS:  - Assess patient’s ability to void and empty bladde care as needed  Outcome: Progressing  Goal: Incision(s), wounds(s) or drain site(s) healing without S/S of infection  Description: INTERVENTIONS:  - Assess and document risk factors for pressure ulcer development  - Assess and document skin integrity  - As Problem: Impaired Activities of Daily Living  Goal: Achieve highest/safest level of independence in self care  Description: Interventions:  - Assess ability and encourage patient to participate in ADLs to maximize function  - Promote sitting position [facility]

## 2021-10-10 NOTE — PLAN OF CARE
No acute changes. Fall precautions in place, bed alarm on. IVF infusing. Pt resting comfortably, call light within reach.     Problem: Patient Centered Care  Goal: Patient preferences are identified and integrated in the patient's plan of care  Description: effectiveness of antiarrhythmic and heart rate control medications as ordered  - Initiate emergency measures for life threatening arrhythmias  - Monitor electrolytes and administer replacement therapy as ordered  Outcome: Progressing     Problem: Kamar Reid ulcer development  - Assess and document skin integrity  - Assess and document dressing/incision, wound bed, drain sites and surrounding tissue  - Implement wound care per orders  - Initiate isolation precautions as appropriate  - Initiate Pressure Ulcer p mobility and gait  - Educate and engage patient/family in tolerated activity level and precautions  Outcome: Not Progressing     Problem: Impaired Activities of Daily Living  Goal: Achieve highest/safest level of independence in self care  Description: Int

## 2021-10-10 NOTE — PLAN OF CARE
Problem: Patient Centered Care  Goal: Patient preferences are identified and integrated in the patient's plan of care  Description: Interventions:  - What would you like us to know as we care for you?  I live home with my sons  - Provide timely, complete, arrhythmias  - Monitor electrolytes and administer replacement therapy as ordered  Outcome: Completed     Problem: GENITOURINARY - ADULT  Goal: Absence of urinary retention  Description: INTERVENTIONS:  - Assess patient’s ability to void and empty bladder needed  Outcome: Completed  Goal: Incision(s), wounds(s) or drain site(s) healing without S/S of infection  Description: INTERVENTIONS:  - Assess and document risk factors for pressure ulcer development  - Assess and document skin integrity  - Assess and d Activities of Daily Living  Goal: Achieve highest/safest level of independence in self care  Description: Interventions:  - Assess ability and encourage patient to participate in ADLs to maximize function  - Promote sitting position while performing ADLs s

## 2021-10-10 NOTE — DIETARY NOTE
ADULT NUTRITION INITIAL ASSESSMENT    Pt is at high nutrition risk. Pt meets severe malnutrition criteria.       CRITERIA FOR MALNUTRITION DIAGNOSIS:  Criteria for severe malnutrition diagnosis: acute illness/injury related to energy intake less than 50% f teeth.      10/10: hard/brown/medium stool    MEDICATIONS: noted  • potassium chloride  40 mEq Intravenous Once   • fluconazole  200 mg Oral Daily   • benadryl/lidocaine/mylanta 1:1:1  10 mL Oral TID AC and HS   • Heparin Sodium (Porcine)  5,000 Units Subcu DIAGNOSIS/PROBLEM:  Malnutrition related to physiological causes resulting in anorexia or diminished intake as evidenced by muscle mass and body fat depletion + skin breakdown.     ESTIMATED NUTRITION NEEDS: Dosing wt: 54 kg  Calories: 1600 calories/day (30

## 2021-10-10 NOTE — OCCUPATIONAL THERAPY NOTE
OCCUPATIONAL THERAPY EVALUATION - INPATIENT     Room Number: 515/515-A  Evaluation Date: 10/10/2021  Type of Evaluation: Initial  Presenting Problem: sepsis due to UTI    Physician Order: IP Consult to Occupational Therapy  Reason for Therapy: ADL/IADL Dys patient's Approx Degree of Impairment: 63.03% has been calculated based on documentation in the [facility] '6 clicks' Inpatient Daily Activity Short Form. Research supports that patients with this level of impairment may benefit from subacute rehab.      Via Page Calderon touch:  intact    Communication: able to communicate basic wants and needs    Behavioral/Emotional/Social: calm    RANGE OF MOTION   Upper extremity ROM is within functional limits     STRENGTH ASSESSMENT  Upper extremity strength is within functional limi minutes in prep for adls with Min A   Comment:    Patient will complete bed mobility with min A  Comment:          Goals  on: 10/23/21  Frequency: 3-5x/week    Claudia Morales OTR/L  [facility]  [id number]

## 2021-10-10 NOTE — DISCHARGE SUMMARY
[facility]    Discharge Summary    Polly Carter Patient Status:  Inpatient    10/23/1950 MRN [id number]   Location [address] [facility] Attending Alondra Green MD   Hosp Day # 2 PCP No primary care provider on file.      Date of Admi confused lately with poor oral intake. In the emergency room, the patient had CBC which showed white blood cell count of 20.8 with left shift. Chemistry was unremarkable. BUN 22, creatinine 0.39, sodium 133, potassium 4.0. Lactic acid 1.6.   Upon Priscilla Sharper and when to take each. Benadryl 12.5 per 5 ML's (1 part), Maalox 1 part, Viscous Lidocaine 2% 1 part, swish and spit q 4 hrs prn   Quantity: 200 mL  Refills: 0     benadryl/lidocaine/mylanta 1:1:1  Commonly known as: MAGIC MOUTHWASH  What changed:  You

## 2021-10-10 NOTE — PROGRESS NOTES
Goals of care conversation  Discussed with Ms. Humaira Pete and her 2 sons. Ms. Humaira Pete has periods of time where she is ANO x3 and decisional, and then she has periods of confusion. She was admitted with urinary tract infection.   Her baseline at home is poor s

## 2021-10-12 NOTE — PAYOR COMM NOTE
--------------  DISCHARGE REVIEW    Payor: [id number] #:  [id number]  Authorization Number: [id number]    Admit date: 10/8/21  Admit time:   2:40 PM  Discharge Date: 10/10/2021  5:20 PM     Admitting Physician: Sandford Duane, MD  At no murmur, click, rub or gallop, regular rate and rhythm  Abdominal: soft, non-tender; bowel sounds normal; no masses,  no organomegaly  Extremities: extremities normal, atraumatic, no cyanosis or edema  Psychiatric: calm        History of Present Illness: START taking these medications      Instructions Prescription details   ciprofloxacin 500 MG Tabs  Commonly known as: CIPRO      Take 1 tablet (500 mg total) by mouth 2 (two) times daily for 7 days.    Stop taking on: October 17, 2021  Quantity: 14 tablet · ciprofloxacin 500 MG Tabs  · fluconazole 200 MG Tabs     Please  your prescriptions at the location directed by your doctor or nurse    Bring a paper prescription for each of these medications  · benadryl/lidocaine/mylanta 1:1:1          Follow

## 2021-10-12 NOTE — PAYOR COMM NOTE
--------------  ADMISSION REVIEW     Payor: [id number] #:  [id number]  Authorization Number: [id number]    Admit date: 10/8/21  Admit time:  2:40 PM       REVIEW DOCUMENTATION:     ED Provider Notes      ED Provider Notes signed by Lubna Aviles 10/08/21 1030 100 %   O2 Device 10/08/21 0945 None (Room air)       Current:BP 93/66   Pulse 93   Temp 97.7 °F (36.5 °C) (Temporal)   Resp 20   Wt 55 kg   SpO2 90%   BMI 22.91 kg/m²         Physical Exam    GENERAL: pt awake, alert, no distress.  Cachectic Abnormality         Status                     ---------                               -----------         ------                     CBC W/ DIFFERENTIAL[[id number]]          Abnormal            Final result                 Please view results for these josefa (36.5 °C) (Temporal)   Resp 20   Wt 55 kg   SpO2 90%   BMI 22.91 kg/m²      I completed the sepsis reassessment at 1250    Cardiac:  Regularity: Regular  Rate: Normal  Heart Sounds: S1,S2    Lungs:   Right: Clear  Left: Clear    Peripheral Pulses:  Radial: presented to the emergency department after she pushed an alert button that she wears. Paramedics arrived. The patient was lying in bed which is her baseline.   Per her son, her mobility has been deteriorating and she seems very confused lately with poor Supple. No lymphadenopathy. Trachea midline. Full range of motion. LUNGS:  Clear to auscultation bilaterally. Normal respiratory effort. HEART:  Regular rate and rhythm. S1, S2 auscultated. No murmur. ABDOMEN:  Soft, nondistended, no tenderness. PLEASE FAX DAYS CERTIFIED AND NEXT REVIEW DATE

## 2021-10-19 PROBLEM — L89.154 PRESSURE INJURY OF SACRAL REGION, STAGE 4 (HCC): Status: ACTIVE | Noted: 2021-01-01

## 2021-10-19 PROBLEM — R62.7 FAILURE TO THRIVE IN ADULT: Status: ACTIVE | Noted: 2021-01-01

## 2021-10-19 NOTE — ED QUICK NOTES
Pt arrived extremely soiled. Patient had a gown on with dirty toilet paper and sheets underneath her. Pt was soiled from head to toe with feces and all bedding and clothing underneath was wet. Pt reports she is from home and saw her sons and  today.

## 2021-10-19 NOTE — H&P
[facility]    PATIENT'S NAME: Jacy Omar   ATTENDING PHYSICIAN: Marivel Bartholomew MD   PATIENT ACCOUNT#:   [id number]    LOCATION:  [place] [address]  MEDICAL RECORD #:   [id number]       YOB: 1950  ADMISSION DATE:       10/19/ arthroplasty. MEDICATIONS:  Please see medication reconciliation list.  Again, it is not clear if the patient takes any of her medications at home. ALLERGIES:  Record indicates penicillin. The patient is not able to verify the reaction.     FAMILY HI Dehydration. 4.   Hypothyroidism. 5.   Severe rheumatoid arthritis. The patient will be admitted to general medical floor. IV vancomycin and Zosyn. IV fluids. Check her TSH. Obtain Wound Service consult.   Mcknight catheter was exchanged in the emerg
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
## 2021-10-19 NOTE — ED INITIAL ASSESSMENT (HPI)
[facility] Pt called 911 herself because she was unable to get herself up from chair. EMS reports pt was soiled upon arrival, lives at home with  and son, neither family member were present.  Pt is verbal

## 2021-10-19 NOTE — PROGRESS NOTES
[facility] Dosing Service    Initial Pharmacokinetic Consult for Vancomycin AUC Dosing    Marichuy Situ is a 65year old patient who is being treated for cellulitis. Pharmacy has been asked to dose vancomycin by .     Weights:  Patient weight

## 2021-10-19 NOTE — ED PROVIDER NOTES
Patient Seen in: [facility] AND [facility] Emergency Department      History   Patient presents with:  Checkup    Stated Complaint:     Subjective:   HPI    68-year-old female reportedly lives at home admitted recently with weakness sepsis and UTI home on oral C tenderness. There is no guarding. Musculoskeletal: Mild pitting lower extremity edema bilaterally. Neurologic: Patient is alert but difficult to assess focality at this time  Skin: Skin is warm and dry.   4 cm circular stage IV sacral decubitus ulcer wit (*)     RDW-SD 63.4 (*)     RDW 18.8 (*)     Neutrophil Absolute Prelim 10.35 (*)     Neutrophil Absolute 10.35 (*)     Monocyte Absolute 1.57 (*)     All other components within normal limits   MAGNESIUM - Normal   ICTOTEST - Normal   REDRAW BMP - Normal stage IV sacral decub  TECHNIQUE: CT images of the abdomen and pelvis were obtained with non-ionic intravenous contrast material.  Automated exposure control for dose reduction was used. Adjustment of the mA and/or kV was done based on the patient's size. Mild bilateral hip osteoarthritis. Leftward curvature of the spine centered at L2-L3. LUNG BASES: Moderate-sized right pleural effusion and small size left pleural effusion.   Bibasilar pulmonary opacities likely representing secondary compressive atelect to obtain basic health screening including reassessment of your blood pressure.       Medications Prescribed:  Current Discharge Medication List                          Hospital Problems             Present on Admission  Date Reviewed: 10/27/2017

## 2021-10-19 NOTE — ED QUICK NOTES
Orders for admission, patient is aware of plan and ready to go upstairs.  Any questions, please call ED TEAGAN villalta at extension [phone]   Type of COVID test sent:rapid  COVID Suspicion level: Low    Titratable drug(s) infusing: n/a  Rate:     LOC at time of tr

## 2021-10-20 NOTE — PROGRESS NOTES
[facility]    Progress Note    Sully Kang Patient Status:  Inpatient    10/23/1950 MRN [id number]   Location [facility] 5SW/SE Attending David Smith MD   Hosp Day # 1 PCP No primary care provider on file.        Subjecti PRN  •  metoclopramide (REGLAN) injection 10 mg, 10 mg, Intravenous, Q8H PRN  •  dextrose 5 %-0.45 % NaCl infusion, , Intravenous, Continuous  •  Vancomycin HCl (VANCOCIN) 1,000 mg in sodium chloride 0.9% 250 mL IVPB-ADDV, 1,000 mg, Intravenous, Q24H    As compatible with given history of decubitus ulcer. Findings appear to extend to the level of the sacrum/bone. No obvious bone remodeling to suggest osteomyelitis.  6.  Age-indeterminate compression deformities at L1 and L3 likely chronic, however correlate

## 2021-10-20 NOTE — PAYOR COMM NOTE
--------------  ADMISSION REVIEW     Payor: [id number] #:  [id number]  Authorization Number: [id number]    Admit date: 10/19/21  Admit time:  3:42 PM       REVIEW DOCUMENTATION:     ED Provider Notes     Patient Seen in: [facility] are extensive findings of stage I and even to breakdown over the lower back, buttocks and proximal posterior thighs likely related to moisture exposure. Mcknight is in place. Dark urine is noted. Psychiatric: Normal mood and affect.   Behavior is normal. COMPARISON: [facility], XR CHEST AP PORTABLE (CPT=71045), 6/10/2021, 10:58 AM.  INDICATIONS: Increased generalized weakness and decreased appetite x3 weeks. TECHNIQUE:   Single view.    FINDINGS:  CARDIAC/VASC: No cardiac silhouette abnorma unremarkable. PANCREAS: No lesion, fluid collection, ductal dilatation, or atrophy. ADRENALS: No nodule or enlargement. KIDNEYS: No enhancing renal lesion or hydronephrosis. AORTA/VASCULAR:   Atherosclerosis in the abdominal aorta and iliac vessels.  RETRO sacrum/bone. No obvious bone remodeling to suggest osteomyelitis. 6.  Age-indeterminate compression deformities at L1 and L3 likely chronic, however correlate for back pain.      Dictated by (CST): Essence Flores MD on 10/19/2021 at 2:47 PM     Finalized by crusted and dried with leakage showed dehydration, ketones, trace leukocyte esterase. Chemistry showed sodium 129, calculated osmolality of 266, BUN and creatinine of 10 and 0.29. Potassium and magnesium levels still pending.   The patient had a CT scan o lymphadenopathy. Trachea midline. Full range of motion. LUNGS:  Clear to auscultation bilaterally. Normal respiratory effort. HEART:  Regular rate and rhythm. S1 and S2 auscultated. No murmur. ABDOMEN:  Soft, nondistended.   Induration noted lower injection 100 mL     Date Action Dose Route User    10/19/2021 1416 Given 65 mL Intravenous Hulen Spikes L      levothyroxine (SYNTHROID) tab 100 mcg     Date Action Dose Route User    10/20/2021 1024 Given 100 mcg Oral Raysa Prajapati, RN      Meropenem ( 100 % — None (Room air) — KB

## 2021-10-20 NOTE — PLAN OF CARE
First step overlay mattress placed. Wound dressings changed by wound RN. Frequent rounding and repositioning. Bed in lowest position and safety precautions in place. Report given to [facility] RN.    Problem: GENITOURINARY - ADULT  Goal: Absence of urinary ret Riddhi Swanson RN  Outcome: Progressing     Problem: PAIN - ADULT  Goal: Verbalizes/displays adequate comfort level or patient's stated pain goal  Description: INTERVENTIONS:  - Encourage pt to monitor pain and request assistance  - Assess pain using appr RN  Outcome: Progressing     Problem: DISCHARGE PLANNING  Goal: Discharge to home or other facility with appropriate resources  Description: INTERVENTIONS:  - Identify barriers to discharge w/pt and caregiver  - Include patient/family/discharge partner in interventions  10/20/2021 1242 by Carlo Rubi RN  Outcome: Progressing  10/20/2021 1209 by Carlo Rubi RN  Outcome: Progressing  Goal: Patient/Family Short Term Goal  Description: Patient's Short Term Goal:     Interventions:   - See additional Car

## 2021-10-20 NOTE — CM/SW NOTE
Pt readmitted from home due to weakness and dehydration. Pt dc to home on 10/10 w/  and sons, current w/ [facility] and CG provided by Vanessa Neri. Pt had hospice eval done however, declined hospice services.     CM met w/ pt to discuss readmit a Pineda Riggs they rec a neuro psych eval for decisional capacity. [facility]  [facility]. APS also rec ref to [facility] for interim svc should pt dc to home. CM called ref to [facility] for follow up.

## 2021-10-20 NOTE — PLAN OF CARE
VSS, some pain with movement. Lots of excoriation and redness on back/bottom. Sacral wound packed wet/dry and covered in mepilex. Mcknight present upon admission. IV fluids running and IV abx hung.      Problem: GENITOURINARY - ADULT  Goal: Absence of urinary level and limitations with patient/family  Outcome: Progressing     Problem: PAIN - ADULT  Goal: Verbalizes/displays adequate comfort level or patient's stated pain goal  Description: INTERVENTIONS:  - Encourage pt to monitor pain and request assistance  - planning  - Arrange for needed discharge resources and transportation as appropriate  - Identify discharge learning needs (meds, wound care, etc)  - Arrange for interpreters to assist at discharge as needed  - Consider post-discharge preferences of patient
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
## 2021-10-20 NOTE — DIETARY NOTE
ADULT NUTRITION INITIAL ASSESSMENT    Pt is at high nutrition risk. Pt meets severe malnutrition criteria.       CRITERIA FOR MALNUTRITION DIAGNOSIS:  Criteria for severe malnutrition diagnosis: chronic illness related to energy intake less than 75% for gr teeth    MEDICATIONS: reviewed  • collagenase   Topical Q shift   • levothyroxine  100 mcg Oral Before breakfast   • piperacillin-tazobactam  3.375 g Intravenous Q8H   • Heparin Sodium (Porcine)  5,000 Units Subcutaneous 2 times per day   • vancomycin  1,0 depletion.     ESTIMATED NUTRITION NEEDS: Dosing wt: 55 kg  Calories: 7091-8012 calories/day (27-30 calories per kg Current wt)  Protein: 72-83 grams protein/day (1.3-1.5 grams protein per kg Current wt)    NUTRITION INTERVENTION:  - Diet: Orders Placed Thi
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
## 2021-10-20 NOTE — PLAN OF CARE
Pt resting in bed, vital signs stable. Refuses repositioning, unable to provide wound care as a result. Pt appears confused, needs frequent redirection & reorientation. Frequent rounding by nursing staff.      Problem: GENITOURINARY - ADULT  Goal: Absence o activity level and limitations with patient/family  Outcome: Progressing     Problem: PAIN - ADULT  Goal: Verbalizes/displays adequate comfort level or patient's stated pain goal  Description: INTERVENTIONS:  - Encourage pt to monitor pain and request assi planning  - Arrange for needed discharge resources and transportation as appropriate  - Identify discharge learning needs (meds, wound care, etc)  - Arrange for interpreters to assist at discharge as needed  - Consider post-discharge preferences of patient

## 2021-10-21 NOTE — OCCUPATIONAL THERAPY NOTE
OCCUPATIONAL THERAPY EVALUATION - INPATIENT     Room Number: 561/561-A  Evaluation Date: 10/21/2021  Type of Evaluation: Initial       Physician Order: IP Consult to Occupational Therapy  Reason for Therapy: ADL/IADL Dysfunction and Discharge Planning    O with 1:1 feed; she requires assist for bathing, grooming, and dressing. She is unable to attend to LE self care.      Cognition: Delayed processing; delayed recall; decreased STM, poor problem solving, judgment, insight to deficits, poor health literacy and OBJECTIVE  Precautions: Bed/chair alarm  Fall Risk: High fall risk    ACTIVITIES OF DAILY LIVING ASSESSMENT  AM-PAC ‘6-Clicks’ Inpatient Daily Activity Short Form  How much help from another person does the patient currently need…  -   Putting on and t

## 2021-10-21 NOTE — PLAN OF CARE
No acute changes throughout the night. Continues on IV zosyn & IV fluids. Patient stated that she would like her dressing change with her morning bath. Safety precautions in place.     Problem: Patient Centered Care  Goal: Patient preferences are identified Encourage toileting schedule  Outcome: Progressing     Problem: DISCHARGE PLANNING  Goal: Discharge to home or other facility with appropriate resources  Description: INTERVENTIONS:  - Identify barriers to discharge w/pt and caregiver  - Include patient/fa Progressing     Problem: MUSCULOSKELETAL - ADULT  Goal: Return mobility to safest level of function  Description: INTERVENTIONS:  - Assess patient stability and activity tolerance for standing, transferring and ambulating w/ or w/o assistive devices  - Ass

## 2021-10-21 NOTE — PLAN OF CARE
Tylenol PRN for pain. Wound dressings changed. Family at bedside. Frequent rounding and repositioning by staff. Bed in lowest position and safety precautions in place.    Problem: GENITOURINARY - ADULT  Goal: Absence of urinary retention  Description: INTER Absence of fever/infection during anticipated neutropenic period  Description: INTERVENTIONS  - Monitor WBC  - Administer growth factors as ordered  - Implement neutropenic guidelines  Outcome: Progressing     Problem: SAFETY ADULT - FALL  Goal: Free from with  & family  - Provide timely, complete, and accurate information to patient/family  - Incorporate patient and family knowledge, values, beliefs, and cultural backgrounds into the planning and delivery of care  - Encourage patient/family to parti

## 2021-10-21 NOTE — PAYOR COMM NOTE
--------------  CONTINUED STAY REVIEW    Payor: [id number] #:  [id number]  Authorization Number: [id number]    Admit date: 10/19/21  Admit time:  3:42 PM        10/21    + INFECTED SACRAL DECUB/POSS UTI  + SKIN BREAKDOWN B/L BUTTOCKS  + (Vitamin B-1) tab 100 mg     Date Action Dose Route User    10/21/2021 0923 Given 100 mg Oral Charon Boxer, RN    10/20/2021 1630 Given 100 mg Oral Virginia Salazar RN      Vancomycin HCl (VANCOCIN) 1,000 mg in sodium chloride 0.9% 250 mL IVPB-ADDV

## 2021-10-21 NOTE — PROGRESS NOTES
[facility] HOSP - [facility]  Progress Note     Arthur Brown  : 10/23/1950    Status: Inpatient  Day #: 2    Attending: Mahnaz Padilla MD  PCP: No primary care provider on file.       Assessment and Plan     Infected sacral decubitus ulcer stage IV  -Appreciat CA 8.3*  --  7.7* 7.9*   ALB  --   --   --  1.2*   *  --  132* 130*   K  --  3.8 3.1* 3.7   CL 98  --  101 102   CO2 21.0  --  21.0 19.0*   GLU 75  --  187* 117*   MG  --  1.9  --  1.7   PHOS  --   --   --  1.6*   TSH  --  19.000*  --   --    T4F ondansetron, metoclopramide      Spent >35 minutes, >50% of the time counseling coordinating care. Discussed plan of care.      Yolis Hwang MD

## 2021-10-22 NOTE — DIETARY NOTE
ADULT NUTRITION REASSESSMENT    Pt is at high nutrition risk. Pt meets severe malnutrition criteria.       CRITERIA FOR MALNUTRITION DIAGNOSIS:  Criteria for severe malnutrition diagnosis: chronic illness related to energy intake less than 75% for greater Poor  Intake: ~15% x5 meals documented since last visit  Intake Meeting Needs: No, but oral nutrition supplements (ONS) to maximize  Percent Meals Eaten (last 3 days)     Date/Time Percent Meals Eaten (%)    10/19/21 1828 20 %    10/20/21 1000 10 %    10/2 22.91 kg/m².   BMI CLASSIFICATION: 19-24.9 kg/m2 - WNL  IBW: 105 lbs        115% IBW  Usual Body Wt: 120 lbs per RD note 10/10/21       100% UBW    WEIGHT HISTORY:  Patient Weight(s) for the past 336 hrs:   Weight   10/20/21 0802 55 kg (121 lb 4.1 oz)     W Nutrient Intake:      Monitor: adequacy of PO intake, tolerance of PO intake, adequacy of supplement intake and tolerance of supplement intake.   - Food and Nutrient Administration:      Monitor: goc/family wishes regarding nutrition  - Anthropometric Measu

## 2021-10-22 NOTE — PROGRESS NOTES
[facility]  Progress Note     Sully Kang  : 10/23/1950    Status: Inpatient  Day #: 3    Attending: Shira Woodruff MD  PCP: No primary care provider on file.       Assessment and Plan     Infected sacral decubitus ulcer stage IV  -Appreciat unlabored  Gastrointestinal:  Soft, non-tender, normal bowel sounds  Musculoskeletal:  No joint swelling  Extremities:  No edema, no cyanosis, no clubbing  Neurologic:  nonfocal  Psychiatric:  Normal affect, calm and appropriate    Intake/Output Summary (L

## 2021-10-22 NOTE — PHYSICAL THERAPY NOTE
Orders received and chart reviewed. TEAGAN Mario approved participation in physical therapy. Patient presented in bed with son present at bedside. Therapist offered to assist patient up to bedside chair to start eating lunch.  Patient declined stating that she Visit Information Date & Time Provider Department Dept. Phone Encounter #  
 7/21/2017  3:00 PM Jessy Cagle MD [facility]  Follow-up Instructions Return in about 3 months (around 10/21/2017). Upcoming Health Maintenance Date Due Pneumococcal 19-64 Medium Risk (1 of 1 - PPSV23) 5/4/1986 DTaP/Tdap/Td series (1 - Tdap) 5/4/1988 FOOT EXAM Q1 7/20/2016 EYE EXAM RETINAL OR DILATED Q1 8/17/2016 HEMOGLOBIN A1C Q6M 11/4/2016 MICROALBUMIN Q1 3/22/2017 BREAST CANCER SCRN MAMMOGRAM 5/4/2017 FOBT Q 1 YEAR AGE 50-75 5/4/2017 INFLUENZA AGE 9 TO ADULT 8/1/2017 LIPID PANEL Q1 12/28/2017 PAP AKA CERVICAL CYTOLOGY 8/19/2018 Allergies as of 7/21/2017  Review Complete On: 7/21/2017 By: Jessy Cagle MD  
  
 Severity Noted Reaction Type Reactions Ciro Appl [Nitrofurantoin Monohyd/m-cryst]  04/20/2015    Other (comments) Pt's skin broke out around her mouth Nitrous Oxide    Unknown (comments) Questran [Cholestyramine (With Sugar)]  08/04/2010    Other (comments) Burned esophagus Reglan [Metoclopramide]  08/04/2010    Anxiety Current Immunizations  Reviewed on 10/28/2013 Name Date Influenza Vaccine 10/28/2013 Not reviewed this visit You Were Diagnosed With   
  
 Codes Comments Encounter for long-term (current) use of high-risk medication    -  Primary ICD-10-CM: D57.091 ICD-9-CM: V58.69 Reflex sympathetic dystrophy     ICD-10-CM: G90.50 ICD-9-CM: 337.20 Autonomic neuropathy     ICD-10-CM: G90.9 ICD-9-CM: 337.9 Degeneration of lumbar or lumbosacral intervertebral disc     ICD-10-CM: M51.37 
ICD-9-CM: 722.52 Chronic pain syndrome     ICD-10-CM: G89.4 ICD-9-CM: 338.4 Gastroparesis     ICD-10-CM: K31.84 ICD-9-CM: 536.3 Insomnia secondary to chronic pain     ICD-10-CM: G89.29, G47.01 
ICD-9-CM: 338.29, 327.01 Vitals BP Pulse Temp Weight(growth percentile) LMP BMI  
 96/64 88 98.3 °F (36.8 °C) 152 lb (68.9 kg) 10/19/2010 24.53 kg/m2 OB Status Smoking Status Hysterectomy Never Smoker BMI and BSA Data Body Mass Index Body Surface Area 24.53 kg/m 2 1.79 m 2 Preferred Pharmacy Pharmacy Name Phone [address], [address] [phone] Your Updated Medication List  
  
   
This list is accurate as of: 7/21/17  4:22 PM.  Always use your most recent med list.  
  
  
  
  
 albuterol 90 mcg/actuation inhaler Commonly known as:  PROVENTIL HFA, VENTOLIN HFA, PROAIR HFA Take 2 Puffs by inhalation every four (4) hours as needed for Wheezing or Shortness of Breath. Alpha Lipoic Acid 600 mg Cap Take  by mouth. AZO 95 mg tab Generic drug:  phenazopyridine Take  by mouth. baclofen 10 mg tablet Commonly known as:  LIORESAL Take  by mouth three (3) times daily. Blood-Glucose Meter monitoring kit Test ac and hs  
  
 busPIRone 10 mg tablet Commonly known as:  BUSPAR Take 10 mg by mouth three (3) times daily. COLACE PO Take  by mouth. COMBIVENT IN Take  by inhalation. * fentaNYL 50 mcg/hr PATCH Commonly known as:  DURAGESIC  
1 Patch by TransDERmal route every fourty-eight (48) hours for 30 days. Max Daily Amount: 1 Patch. For chronic pain. Indications: Chronic Pain with Opioid Tolerance, SEVERE PAIN WITH OPIOID TOLERANCE Start taking on:  7/24/2017 * fentaNYL 50 mcg/hr PATCH Commonly known as:  DURAGESIC  
1 Patch by TransDERmal route every fourty-eight (48) hours for 30 days. Max Daily Amount: 1 Patch. For chronic pain. Indications: Chronic Pain with Opioid Tolerance, SEVERE PAIN WITH OPIOID TOLERANCE Start taking on:  8/22/2017 * fentaNYL 50 mcg/hr PATCH Commonly known as:  DURAGESIC  
1 Patch by TransDERmal route every fourty-eight (48) hours for 30 days. Max Daily Amount: 1 Patch. For chronic pain. Indications: Chronic Pain with Opioid Tolerance, SEVERE PAIN WITH OPIOID TOLERANCE Start taking on:  9/20/2017  
  
 glucose blood VI test strips strip Commonly known as:  ASCENSIA AUTODISC VI, ONE TOUCH ULTRA TEST VI Check blood sugar twice daily and as needed. hydrocortisone 10 mg tablet Commonly known as:  CORTEF  
2 in am, 2 at lunch, 1 in pm  
  
 HYOMAX 0.125 mg tablet Generic drug:  hyoscyamine Take 125 mcg by mouth every four (4) hours as needed. ipratropium 0.02 % nebulizer solution Commonly known as:  ATROVENT  
2.5 mL by Nebulization route every four (4) hours as needed for Wheezing. JANUMET 50-1,000 mg per tablet Generic drug:  SITagliptin-metFORMIN Take 1 Tab by mouth daily. JANUVIA 100 mg tablet Generic drug:  SITagliptin LEVOTHROID PO Take  by mouth. LINZESS 145 mcg Cap capsule Generic drug:  linaclotide Take 290 mcg by mouth daily. naloxone 4 mg/actuation Spry 4 mg by Nasal route as needed for up to 2 doses. Indications: OPIOID TOXICITY  
  
 * oxyCODONE IR 10 mg Tab immediate release tablet Commonly known as:  Verlan Andrés Take 1 Tab by mouth every six (6) hours as needed for up to 30 days. Max Daily Amount: 40 mg. For breakthrough pain. Indications: Pain Start taking on:  7/24/2017 * oxyCODONE IR 10 mg Tab immediate release tablet Commonly known as:  Verlan Andrés Take 1 Tab by mouth every six (6) hours as needed for up to 30 days. Max Daily Amount: 40 mg. For breakthrough pain. Indications: Pain Start taking on:  8/22/2017 * oxyCODONE IR 10 mg Tab immediate release tablet Commonly known as:  Verlan Andrés Take 1 Tab by mouth every six (6) hours as needed for up to 30 days. Max Daily Amount: 40 mg. For breakthrough pain. Indications: Pain Start taking on:  9/20/2017  
  
 promethazine 25 mg tablet Commonly known as:  PHENERGAN  
 Take 1 Tab by mouth every eight (8) hours as needed for Nausea. pyridostigmine 60 mg tablet Commonly known as:  MESTINON  
  
 RELISTOR 12 mg/0.6 mL Kit Generic drug:  Methylnaltrexone  
by SubCUTAneous route every seventy-two (72) hours. tiaGABine 4 mg tablet Commonly known as:  GABITRIL  
TAKE ONE TABLET BY MOUTH THREE TIMES DAILY FOR 90 DAYS  
  
 topiramate 25 mg tablet Commonly known as:  TOPAMAX Take 75 mg by mouth two (2) times a day. XYZAL 5 mg tablet Generic drug:  levocetirizine Take  by mouth. ZOLOFT 100 mg tablet Generic drug:  sertraline Take  by mouth daily. * zolpidem 10 mg tablet Commonly known as:  AMBIEN Take  by mouth nightly as needed for Sleep. * zolpidem 10 mg tablet Commonly known as:  AMBIEN Take 1 Tab by mouth nightly as needed for Sleep for up to 30 days. Max Daily Amount: 10 mg. Indications: SLEEP-ONSET INSOMNIA Start taking on:  7/24/2017 ZyrTEC 10 mg Cap Generic drug:  Cetirizine Take  by mouth. * Notice: This list has 8 medication(s) that are the same as other medications prescribed for you. Read the directions carefully, and ask your doctor or other care provider to review them with you. Prescriptions Printed Refills  
 oxyCODONE IR (ROXICODONE) 10 mg tab immediate release tablet 0 Starting on: 9/20/2017 Sig: Take 1 Tab by mouth every six (6) hours as needed for up to 30 days. Max Daily Amount: 40 mg. For breakthrough pain. Indications: Pain Class: Print Route: Oral  
 oxyCODONE IR (ROXICODONE) 10 mg tab immediate release tablet 0 Starting on: 8/22/2017 Sig: Take 1 Tab by mouth every six (6) hours as needed for up to 30 days. Max Daily Amount: 40 mg. For breakthrough pain. Indications: Pain Class: Print Route: Oral  
 oxyCODONE IR (ROXICODONE) 10 mg tab immediate release tablet 0 Starting on: 7/24/2017 Sig: Take 1 Tab by mouth every six (6) hours as needed for up to 30 days. Max Daily Amount: 40 mg. For breakthrough pain. Indications: Pain Class: Print Route: Oral  
 fentaNYL (DURAGESIC) 50 mcg/hr PATCH 0 Starting on: 2017 Si Patch by TransDERmal route every fourty-eight (48) hours for 30 days. Max Daily Amount: 1 Patch. For chronic pain. Indications: Chronic Pain with Opioid Tolerance, SEVERE PAIN WITH OPIOID TOLERANCE Class: Print Route: TransDERmal  
 zolpidem (AMBIEN) 10 mg tablet 5 Starting on: 2017 Sig: Take 1 Tab by mouth nightly as needed for Sleep for up to 30 days. Max Daily Amount: 10 mg. Indications: SLEEP-ONSET INSOMNIA Class: Print Route: Oral  
  
We Performed the Following AMB POC DRUG SCREEN () [ [facility]] DRUG SCREEN [[id number] Custom] Follow-up Instructions Return in about 3 months (around 10/21/2017). Patient Instructions Current health maintenance issues were reviewed and the patient was advised to followup with his/her PCP for completion of these items. Introducing [facility]! Dear Nallely Dutton: Thank you for requesting a [employer] account. Our records indicate that you already have an active [employer] account.   You can access your account anytime at [email or web address] Did you know that you can access your hospital and ER discharge instructions at any time in [employer]? You can also review all of your test results from your hospital stay or ER visit. Additional Information If you have questions, please visit the Frequently Asked Questions section of the [employer] website at [email or web address] Remember, [employer] is NOT to be used for urgent needs. For medical emergencies, dial 911. Now available from your iPhone and Android! Please provide this summary of care documentation to your next provider. Your primary care clinician is listed as Hayde Wolff. If you have any questions after today's visit, please call [phone].

## 2021-10-22 NOTE — PLAN OF CARE
No acute changes overnight. Wound care provided. Pt refusing repositioning at times. Monitoring vitals. Fall risk precautions. Will continue to monitor.      Problem: GENITOURINARY - ADULT  Goal: Absence of urinary retention  Description: INTERVENTIONS:  - patient/family  Outcome: Not Progressing     Problem: PAIN - ADULT  Goal: Verbalizes/displays adequate comfort level or patient's stated pain goal  Description: INTERVENTIONS:  - Encourage pt to monitor pain and request assistance  - Assess pain using appr discharge resources and transportation as appropriate  - Identify discharge learning needs (meds, wound care, etc)  - Arrange for interpreters to assist at discharge as needed  - Consider post-discharge preferences of patient/family/discharge partner  - Co

## 2021-10-22 NOTE — CDS QUERY
Potential for Impaired Nutritional Status  DOCUMENTATION CLARIFICATION FORM  Dear Doctor:  Clinical information suggests potential for impaired nutritional status.  For accurate ICD-10-CM code assignment to reflect severity of illness and risk of mortality, however pt looks smaller than 120#. Re-weigh ordered to better assess wt. Per RN pt can be confused at times. Refusing multiple aspects of care per RN/chart.       FOOD/NUTRITION RELATED HISTORY:  Appetite: Poor  Intake: ~10-20% x3 meals documented since ad for the past 336 hrs:    Weight   10/20/21 0802 55 kg (121 lb 4.1 oz)      Wt Readings from Last 10 Encounters:  10/20/21 : 55 kg (121 lb 4.1 oz)  10/08/21 : 55 kg (121 lb 4.1 oz)  06/10/21 : 54.4 kg (120 lb)  06/02/21 : 54.4 kg (120 lb)  11/27/17 : 50.8 k weight  - Nutrition Goals:      gradual wt gain as able, maintain wt within 5%, PO and supplement greater than 75% of needs, good supplement intake, labs WNL, support wound healing and improved GI status     DIETITIAN FOLLOW UP: RD to follow and monitor nu

## 2021-10-23 NOTE — PROGRESS NOTES
[facility]  Progress Note     Jorge Meza  : 10/23/1950    Status: Inpatient  Day #: 4    Attending: Wilbert Rodriguez MD  PCP: No primary care provider on file.       Assessment and Plan     Infected sacral decubitus ulcer stage IV  -Appreciat joint swelling  Extremities:  No edema, no cyanosis, no clubbing  Neurologic:  nonfocal  Psychiatric:  Normal affect, calm and appropriate    Intake/Output Summary (Last 24 hours) at 10/23/2021 7408  Last data filed at 10/23/2021 0800  Gross per 24 hour

## 2021-10-23 NOTE — PLAN OF CARE
No acute changes throughout the night. Continues on IV fluids & IV zosyn & IV vancomycin. Patient stated \"All of you people are in here every hour- trying to do the same thing (turning). Can you people just leave me alone? \" Refusing care at times.  MD not discharge planning  - Arrange for needed discharge resources and transportation as appropriate  - Identify discharge learning needs (meds, wound care, etc)  - Arrange for interpreters to assist at discharge as needed  - Consider post-discharge preferences Consider cultural and social influences on pain and pain management  - Manage/alleviate anxiety  - Utilize distraction and/or relaxation techniques  - Monitor for opioid side effects  - Notify MD/LIP if interventions unsuccessful or patient reports new jesu scan as needed  - Follow urinary retention protocol/standard of care  - Consider collaborating with pharmacy to review patient's medication profile  - Implement strategies to promote bladder emptying  Outcome: Progressing

## 2021-10-23 NOTE — PLAN OF CARE
Problem: GENITOURINARY - ADULT  Goal: Absence of urinary retention  Description: INTERVENTIONS:  - Assess patient’s ability to void and empty bladder  - Monitor intake/output and perform bladder scan as needed  - Follow urinary retention protocol/standar INTERVENTIONS:  - Encourage pt to monitor pain and request assistance  - Assess pain using appropriate pain scale  - Administer analgesics based on type and severity of pain and evaluate response  - Implement non-pharmacological measures as appropriate and coordinating discharge planning if the patient needs post-hospital services based on physician/LIP order or complex needs related to functional status, cognitive ability or social support system  Outcome: Not Progressing     Problem: Patient/Family Goals

## 2021-10-23 NOTE — PROGRESS NOTES
[facility] Dosing Service    Follow-up Pharmacokinetic Consult for Vancomycin AUC Dosing     Tucker King is a 66year old patient who is being treated for cellulitis. Patient is on day 3 of vancomycin and is currently receiving 1000 mg IV Q 24 hours.

## 2021-10-24 NOTE — PLAN OF CARE
Problem: GENITOURINARY - ADULT  Goal: Absence of urinary retention  Description: INTERVENTIONS:  - Assess patient’s ability to void and empty bladder  - Monitor intake/output and perform bladder scan as needed  - Follow urinary retention protocol/standar Encourage pt to monitor pain and request assistance  - Assess pain using appropriate pain scale  - Administer analgesics based on type and severity of pain and evaluate response  - Implement non-pharmacological measures as appropriate and evaluate response Consider post-discharge preferences of patient/family/discharge partner  - Complete POLST form as appropriate  - Assess patient's ability to be responsible for managing their own health  - Refer to Case Management Department for coordinating discharge plan

## 2021-10-24 NOTE — PHYSICAL THERAPY NOTE
PHYSICAL THERAPY EVALUATION & DISCHARGE - INPATIENT    Room Number: 561/561-A  Evaluation Date: 10/24/2021  Type of Evaluation: Initial   Physician Order: PT Eval and Treat    Presenting Problem: Failure to thrive in adult  Reason for Therapy: Mobility Dy lift equipment, wheelchair, ambulance transport into home and assist for all cares with 24 hour assistance needed.      DISCHARGE RECOMMENDATIONS  PT Discharge Recommendations: Long term care    PLAN    Patient has been evaluated and now refuses any skilled assessment. Lower extremity strength is severely impaired - <2/5.     BALANCE  Static Sitting: Not tested  Dynamic Sitting: Not tested  Static Standing: Not tested  Dynamic Standing: Not tested    ADDITIONAL TESTS                                 Mohamud West

## 2021-10-24 NOTE — PROGRESS NOTES
[facility] - [facility]     Hospitalist Progress Note     Patricia Jazmin Patient Status:  Inpatient    10/23/1950 MRN [id number]   Location [facility] 5SW/SE Attending Donnell Leyva, [address] [facility] Day # 5 PCP No primary care provider on file. 10/22/21  2051 10/23/21  0637   WBC 13.7* 12.1* 11.3* 12.9* 12.7*   HGB 15.0 11.4* 12.4 12.6 12.3   MCV 94.0 95.6 103.3* 90.0 96.2   .0 264.0 283.0 309.0 274.0   BAND  --   --   --   --  3       Recent Labs   Lab 10/21/21  0718 10/21/21  0718 10/22/ Assessment & Plan:      Infected sacral decubitus ulcer stage IV  -Appreciate wound care evaluation  -general surgery consulted but patient refusing examination or procedure  -Declined wound evaluation today.    -cont abx     Severe malnutrition  -co

## 2021-10-24 NOTE — BH PROGRESS NOTE
Pt seen, consult dictated. Rec: Add Remeron 15 mg at bedtime to help her sleep at night, and increase her francisco.

## 2021-10-24 NOTE — CONSULTS
[facility]    PATIENT'S NAME: Katie Xenaroscoe   ATTENDING PHYSICIAN: Billie Rizzo MD   CONSULTING PHYSICIAN: Ralph Lebron MD   PATIENT ACCOUNT#:   [de-identified]    LOCATION:  [address] [place] [address] #:   [id number]       DATE OF BIRTH:  8 Supposedly, she was being seen by a nurse and PT at home. She has been refusing care off and on here such as turning. She refused physical therapy today.     During my interview with the patient, which was conducted with her son in the room, she said that Laxmi Mendosa had to start going to college, the 2 brothers started helping her. They will get her up vertically with the elderly chair and changed her diaper. However, nobody looked at her backside at all.   Although the patient said she would clean herself as multiple bedsores. PAST PSYCHIATRIC HISTORY:  None. MEDICATIONS:  Collagenase, folic acid, heparin, levothyroxine, multivitamin, Zosyn, thiamine, vancomycin. ALLERGIES:  Penicillin.     SOCIAL HISTORY:  The chart reports that she is living with he previous president, although she did refer to one of the presidents as being, \"that black luis angel. \"  When asked to do serial sevens, she was only able to give me the answer of 11. She was able to correctly spell the word world backwards.        INITIAL ANA they continued in this way, that she would die, probably coming back to the hospital every couple of weeks with these kinds of problems and symptoms. They both want her to go home, although the son suggested [facility] as a possibility.   The patient refuse

## 2021-10-25 NOTE — DISCHARGE SUMMARY
[facility] - [facility]  Discharge Summary     Karen Camreon  : 10/23/1950    Status: Inpatient  Day #: 6    Attending: Matthew Jean MD  PCP: No primary care provider on file.      Date of Admission:  10/19/2021  Date of Discharge:  10/25/2021     [facility]  -Will cont abx as above.     Dehydration  -Improving  -Decrease IVF   -Encourage PO intake     Hypothyroidism  -T4 low-normal  -Cont levothyroxine  -Will need endocrine follow as OP     Severe rheumatoid art for 14 days. Stop taking on: November 8, 2021  Quantity: 28 capsule  Refills: 0     collagenase 250 UNIT/GM Oint  Commonly known as: SANTYL      Apply 1 Application topically every 8 (eight) hours.    Quantity: 30 g  Refills: 2     mirtazapine 15 MG Tabs MD

## 2021-10-25 NOTE — PAYOR COMM NOTE
--------------  DISCHARGE REVIEW    Payor: [id number] [employer] [facility] [address] #:  [id number]  Authorization Number: PENDING    Admit date: 10/19/21  Admit time:   3:42 PM  Discharge Date: No discharge date for patient encounter.      Admitting Physician: Johnson Nieto of 10 and 0.29. Potassium and magnesium levels still pending. The patient had a CT scan of the abdomen and pelvis to further evaluate lower abdominal induration that feels like a fibroid uterus.   Started on IV vancomycin and Zosyn for developing infected lb 4.1 oz (55 kg), SpO2 100 %, not currently breastfeeding.   General:  Alert, no distress  HEENT:  Normocephalic, atraumatic  Cardiac:  Regular rate, regular rhythm  Pulmonary:  Clear to auscultation bilaterally, respirations unlabored  Gastrointestinal: Please  your prescriptions at the location directed by your doctor or nurse    Bring a paper prescription for each of these medications  · Cefadroxil 500 MG Caps  · collagenase 250 UNIT/GM Oint  · mirtazapine 15 MG Tabs  · multivitamin with minera

## 2021-10-25 NOTE — PAYOR COMM NOTE
--------------  CONTINUED STAY REVIEW    Payor: [id number] #:  [id number]  Authorization Number: PENDING    Admit date: 10/19/21  Admit time:  3:42 PM    10/20 GENERAL SURGERY CONSULT:  Impression:     Failure to thrive in adult     have the level of supervision needed, but she is adamant about going home. Son just wants to go by her wishes.  SW assisting with home services  -APS notified per SW  -psych consulted to assist determining competency  -patient has declined hospice care    10/20/21  0529 10/21/21  0718 10/22/21  2051 10/23/21  0637   WBC 13.7* 12.1* 11.3* 12.9* 12.7*   HGB 15.0 11.4* 12.4 12.6 12.3   MCV 94.0 95.6 103.3* 90.0 96.2   .0 264.0 283.0 309.0 274.0   BAND  --   --   --   --  3                 Recent Labs Heparin Sodium (Porcine)  5,000 Units Subcutaneous 2 times per day               Assessment & Plan:          Infected sacral decubitus ulcer stage IV  -Appreciate wound care evaluation  -general surgery consulted but patient refusing examination or procedu air) — SW   10/24/21 2055 98 °F (36.7 °C) 83 18 114/59 100 % — None (Room air) — SW   10/24/21 0932 98.1 °F (36.7 °C) 96 18 118/60 100 % — None (Room air) — [address]   10/24/21 0551 97.8 °F (36.6 °C) 96 16 115/62 100 % — None (Room air) — AM   10/23/21 2138 97. 5 Latest Ref Range: 275 - 295 mOsm/kg 275  286   PHOSPHORUS Latest Ref Range: 2.5 - 4.9 mg/dL 2.8     WBC Latest Ref Range: 4.0 - 11.0 x10(3) uL 12.7 (H)  13.3 (H)   Hemoglobin Latest Ref Range: 12.0 - 16.0 g/dL 12.3  13.1   Hematocrit Latest Ref Range: 35. 0 PLATELET MORPHOLOGY Latest Ref Range: Normal  Normal  Normal   VANCOMYCIN TROUGH Latest Ref Range: 10.0 - 20.0 ug/mL  16.5                      MEDICATIONS ADMINISTERED IN LAST 1 DAY:  collagenase (SANTYL) 250 UNIT/GM ointment     Date Action Dose Route SpO2 Weight O2 Device O2 Flow Rate (L/min) Who    10/25/21 0827 98.3 °F (36.8 °C) 98 16 106/60 100 % — None (Room air) — SV    10/25/21 0400 97.5 °F (36.4 °C) 87 18 121/70 100 % — None (Room air) — SW    10/24/21 2055 98 °F (36.7 °C) 83 18 114/59 100 % — N

## 2021-10-25 NOTE — PROGRESS NOTES
1145: Patients medications faxed to [facility] in [place].  meds are cefadroxil (14 days), santyl, multivitamin with mineral oil, thiamine, mirtazipine

## 2021-10-25 NOTE — PROGRESS NOTES
Discharge RN Summary: Patient has discharge order in. Patient to discharge home with sons. IV to be removed by primary RN. Ambulance to  patient at 4pm, PCS completed. Spoke to Jackson Peacock and Grant about discharge paperwork.  Both sons understand wound c

## 2021-10-25 NOTE — HOSPICE RN NOTE
RH TNL at bedside after receiving hospice referral. Patient A/Ox4, relates that she is now interested in hospice but that she wants to go home and stay home and that a meeting should be set up with her sons.  Patient's son Marquise Rasmussen called to arrange informatio

## 2021-10-25 NOTE — PLAN OF CARE
Patient's wound dressing on sacrum and both buttocks changed. VSS. Patient notified of discharge plan and time and is  understanding. Family also notified.      Problem: GENITOURINARY - ADULT  Goal: Absence of urinary retention  Description: INTERVENTIONS: limitations with patient/family  Outcome: Adequate for Discharge     Problem: PAIN - ADULT  Goal: Verbalizes/displays adequate comfort level or patient's stated pain goal  Description: INTERVENTIONS:  - Encourage pt to monitor pain and request assistance patient/family/discharge partner in discharge planning  - Arrange for needed discharge resources and transportation as appropriate  - Identify discharge learning needs (meds, wound care, etc)  - Arrange for interpreters to assist at discharge as needed  - for Discharge

## 2021-11-01 NOTE — ED INITIAL ASSESSMENT (HPI)
Patient brought in by [place] [facility] and [place] [employer]. Patient lives at home with her 2 sons who both work full time jobs. EMS states they have seen this patient for the past couple years and watched her decline.  [place] pd states the brothers are n

## 2021-11-02 PROBLEM — M06.9 RHEUMATOID ARTHRITIS (HCC): Status: ACTIVE | Noted: 2021-01-01

## 2021-11-02 NOTE — PROGRESS NOTES
[place] [facility]    Progress Note    Versa Vandana Patient Status:  Inpatient    10/23/1950 MRN [id number]   Location [facility] 4W/SW/SE Attending Garcia Ramirez MD   Hosp Day # 1 PCP No primary care provider on file.        Subjective: 139 10/25/2021    K 3.0 (L) 10/25/2021     10/25/2021    CO2 19.0 (L) 10/25/2021     (H) 10/25/2021    CA 7.6 (L) 10/25/2021    ALB 1.2 (L) 10/21/2021    ALKPHO 82 10/24/2017    BILT 0.5 10/24/2017    TP 8.1 10/24/2017    AST 24 10/24/2017 35 minutes spent, >50% spent counseling re: treatment plan and workup.     Shade Bedolla MD, PhD  Page [phone]  [facility]

## 2021-11-02 NOTE — H&P
[facility]    PATIENT'S NAME: Charlene Oh   ATTENDING PHYSICIAN: Sudeep Smith MD   PATIENT ACCOUNT#:   [de-identified]    LOCATION:  [facility]  MEDICAL RECORD #:   [id number]       YOB: 1950  ADMISSION DATE:       11/01/2021    H Atraumatic. Oropharynx clear. Dry mucous membranes. Ears, nose normal.  Eyes:  Anicteric sclerae. NECK:  Supple. No lymphadenopathy. LUNGS:  Clear to auscultation bilaterally. Normal respiratory effort. HEART:  Regular rate, rhythm.   S1 and S2 a

## 2021-11-02 NOTE — CM/SW NOTE
HOLLY discussed case with [facility] SW. Patient will need SNF placement with Hospice care. HOLLY called [phone] to confirm Medicaid coverage. HOLLY placed tent referral in [place] for SNF placement, need to provide choice list once avail.      HOLLY carvajal

## 2021-11-02 NOTE — CM/SW NOTE
LSW stopped by patient room twice, 11 am and 12:30. Patient is nonsensical. Attempted calling POA ammy [place] at home number [phone] several times going to voicemail.   Case coordination with [facility] Windy Cassidy at [phone] Marita Arroyo

## 2021-11-02 NOTE — HOSPICE RN NOTE
[facility] nursing visit for home hospice patient brought to [facility] ED by EMS and PD. Patient is home hospice patient admitted to hospice on 10/30/21. Apparently 3 sons living with patient can no longer take care of her at home.  Patient

## 2021-11-02 NOTE — HOSPICE RN NOTE
[facility] Nursing Rounds: pt visit conducted, no visitors present. Per [facility] SW, attempts to reach Brooklyn Quintero has been made, left voicemail. Pt alert x's 1-2, denies pain. Pt with extensive wounds, refusing care and assessments at this time.

## 2021-11-02 NOTE — HOSPICE RN NOTE
[facility] Inpatient Nursing Rounds: Pt visit conducted with son, Sloan Resendez at bedside. Pt alert x's 1-2, denies pain at this time. Son aware and agreeable with plan to send to [facility] tomorrow. On room air, still with esposito with wei colored urine.

## 2021-11-03 NOTE — PLAN OF CARE
Please advise on patient's lab results.  Thank you.    Component      Latest Ref Rng & Units 9/24/2020   WBC      4.2 - 11.0 K/mcL 9.6   RBC      4.50 - 5.90 mil/mcL 5.28   HGB      13.0 - 17.0 g/dL 14.6   HCT      39.0 - 51.0 % 47.6   MCV      78.0 - 100.0 fl 90.2   MCH      26.0 - 34.0 pg 27.7   MCHC      32.0 - 36.5 g/dL 30.7 (L)   RDW-CV      11.0 - 15.0 % 14.5   PLT      140 - 450 K/mcL 277   NRBC      <=0 /100 WBC 0   Neutrophil      % 84   LYMPH      % 10   MONO      % 4   EOSIN      % 2   BASO      % 0   Immature Granulocytes      % 0   Absolute Neutrophil      1.8 - 7.7 K/mcL 8.0 (H)   Absolute Lymph      1.0 - 4.0 K/mcL 1.0   Absolute Mono      0.3 - 0.9 K/mcL 0.4   Absolute Eos      0.1 - 0.5 K/mcL 0.2   Absolute Baso      0.0 - 0.3 K/mcL 0.0   Absolute Immature Granulocytes      0.0 - 0.2 K/mcL 0.0   RDW-SD      39.0 - 50.0 fL 47.6   Fasting Status      Hours 0   Sodium      135 - 145 mmol/L 139   Potassium      3.4 - 5.1 mmol/L 4.3   Chloride      98 - 107 mmol/L 107   CO2      21 - 32 mmol/L 26   ANION GAP      10 - 20 mmol/L 10   Glucose      65 - 99 mg/dL 134 (H)   BUN      6 - 20 mg/dL 30 (H)   Creatinine      0.67 - 1.17 mg/dL 1.12   Glomerular Filtration Rate      >90 mL/min/1.73m2 66 (L)   BUN/CREATININE RATIO      7 - 25 27 (H)   TOTAL BILIRUBIN      0.2 - 1.0 mg/dL 0.5   AST/SGOT      <=37 Units/L 43 (H)   ALK PHOSPHATASE      45 - 117 Units/L 87   Albumin      3.6 - 5.1 g/dL 3.7   TOTAL PROTEIN      6.4 - 8.2 g/dL 7.1   GLOBULIN      2.0 - 4.0 g/dL 3.4   A/G Ratio, Serum      1.0 - 2.4 1.1   ALT/SGPT      <64 Units/L 36   CALCIUM      8.4 - 10.2 mg/dL 9.1   C-REACTIVE PROTEIN      <=1.0 mg/dL 1.8 (H)   ESR      0 - 20 mm/hr 53 (H)      Pt alert and oriented X1. Pt asked for coffee and applesauce this evening and had a couple of bites/sips. Pt refused a bath and did not allow the RN to assess her pressure wounds. Repositioned pt with pillow support multiple times for comfort.  Refused medi consideration of family and cultural values  - Communicate willingness to discuss death and facilitate grief process  with patient/family as appropriate  - Emphasize sustaining relationships within family system and community, or nilesh/spiritual traditions

## 2021-11-03 NOTE — HOSPICE RN NOTE
GIP patient   She was resting at time of visit today  No sign of facial grimace or moaning  Tried to call her son Ciro Rooney no answer not able to leave a message the box ifs full  PPS 20%  Dressing in place to right shoulder  I did not wake the patient and ass

## 2021-11-03 NOTE — PLAN OF CARE
Plan to be discharged to SNF with hospice. Dressing to coccyx changed with wet to dry dressing. Pt bathed today. No complaints of pain.    Problem: Patient Centered Care  Goal: Patient preferences are identified and integrated in the patient's plan of care relationships within family system and community, or nilesh/spiritual traditions  - Initiate Spiritual Care consult as needed  Outcome: Adequate for Discharge     Problem: CHANGE IN BODY IMAGE  Goal: Pt/Family communicate acceptance of loss or change in bod

## 2021-11-03 NOTE — PLAN OF CARE
Problem: ANXIETY  Goal: Will report anxiety at manageable levels  Description: INTERVENTIONS:  - Administer medication as ordered  - Teach and rehearse alternative coping skills  - Provide emotional support with 1:1 interaction with staff  Outcome: Progr Fall precautions in place. Son was here this evening and spoke with hospice nurse, coping well.

## 2021-11-03 NOTE — DISCHARGE SUMMARY
[place] [facility] - [facility]    Discharge Summary    Alessandra Byers Patient Status:  Inpatient    10/23/1950 MRN [id number]   Location [facility] 4W/SW/SE Attending Carolina Carpenter MD   Hosp Day # 2 PCP No primary care provider on file.      Date initiated on home hospice. Today, she was brought back because of reports of possible failure to thrive. The patient has been left alone at home without care for an unknown time. While she is alone, there is no heat.   Other details are not available at Discharge Instructions: per hospice    [facility], MD  11/3/2021  2:07 PM    > 35 min

## 2021-11-19 NOTE — H&P
[facility]    PATIENT'S NAME: Zandra Kussmaul   ATTENDING PHYSICIAN: Park Salinas MD   PATIENT ACCOUNT#:   [de-identified]    LOCATION:  [address] [place] [address] RECORD #:   [id number]       YOB: 1950  ADMISSION DATE:       11/01/202 107/82, pulse ox 95% on room air. HEENT:  Atraumatic. Oropharynx clear. Dry mucous membranes. Ears, nose normal. Eyes:  Anicteric sclerae. NECK:  Supple. No lymphadenopathy. LUNGS:  Clear to auscultation bilaterally. Normal respiratory effort.   HE

## (undated) NOTE — IP AVS SNAPSHOT
[facility]            (For Outpatient Use Only) Initial Admit Date: 10/8/2021   Inpt/Obs Admit Date: Inpt: 10/8/21 / Obs: N/A   Discharge Date:    Selina Later:  [de-identified]   MRN: [de-identified]   CSN: [id number]   CEID: [id number]        FJI :    Subscriber ID:  Pt Rel to Subscriber:    Hospital Account Financial Class: Medicare Advantage    October 10, 2021

## (undated) NOTE — LETTER
October 27, 2017         Callum John DO  [address] [place]      Patient: Brennen Antoine   YOB: 1950   Date of Visit: 10/27/2017       Dear Dr. Tiki Solomon saw your patient, Brennen Antoine, on 10/27/2

## (undated) NOTE — IP AVS SNAPSHOT
[facility]            (For Outpatient Use Only) Initial Admit Date: 10/19/2021   Inpt/Obs Admit Date: Inpt: 10/19/21 / Obs: N/A   Discharge Date:    Edie Ford:  [de-identified]   MRN: [de-identified]   CSN: [id number]   CEID: [id number]        E Rel to Subscriber:    Hospital Account Financial Class: Medicare Advantage    October 25, 2021

## (undated) NOTE — IP AVS SNAPSHOT
Patient Demographics     [facility] [address] Phone  [phone] [facility]) *Preferred*  [phone] [facility])      Emergency Contact(s)     Name Relation Home Work [place] Son [phone]    Brisa Mcallister [place] [facility] - [facility]    Discharge Summary    Patricia Wu Patient Status:  Inpatient    10/23/1950 MRN [id number]   Location [facility] 4W/SW/SE Attending Evert Sparks MD   Hosp Day # 2 PCP No primary care provider on file.      Dioni was initiated on home hospice. Today, she was brought back because of reports of possible failure to thrive. The patient has been left alone at home without care for an unknown time. While she is alone, there is no heat.   Other details are not available Discharge Instructions: per hospice    [facility], MD  11/3/2021  2:07 PM    > 35 min       Electronically signed by Hugh Guy MD on 11/3/2021  2:11 PM           Physical Therapy Notes (last 72 hours)  Notes from 10/31/2021  2:52 PM through 11/3

## (undated) NOTE — IP AVS SNAPSHOT
[facility]            (For Outpatient Use Only) Initial Admit Date: 11/1/2021   Inpt/Obs Admit Date: Inpt: 11/1/21 / Obs: N/A   Discharge Date:    Edie Ford:  [de-identified]   MRN: [de-identified]   CSN: [id number]   CEID: [id number]        [facility] Insurance Type:    Subscriber Name:  Subscriber :    Subscriber ID:  Pt Rel to Subscriber:    Hospital Account Financial Class:  Other    November 3, 2021